# Patient Record
Sex: FEMALE | Race: WHITE | NOT HISPANIC OR LATINO | Employment: FULL TIME | URBAN - METROPOLITAN AREA
[De-identification: names, ages, dates, MRNs, and addresses within clinical notes are randomized per-mention and may not be internally consistent; named-entity substitution may affect disease eponyms.]

---

## 2017-07-27 ENCOUNTER — OFFICE VISIT (OUTPATIENT)
Dept: OBSTETRICS AND GYNECOLOGY | Age: 27
End: 2017-07-27

## 2017-07-27 VITALS
HEIGHT: 66 IN | DIASTOLIC BLOOD PRESSURE: 60 MMHG | WEIGHT: 144.2 LBS | SYSTOLIC BLOOD PRESSURE: 114 MMHG | BODY MASS INDEX: 23.18 KG/M2

## 2017-07-27 DIAGNOSIS — Z12.4 ROUTINE CERVICAL SMEAR: ICD-10-CM

## 2017-07-27 DIAGNOSIS — Z11.51 SPECIAL SCREENING EXAMINATION FOR HUMAN PAPILLOMAVIRUS (HPV): ICD-10-CM

## 2017-07-27 DIAGNOSIS — N92.0 MENORRHAGIA WITH REGULAR CYCLE: ICD-10-CM

## 2017-07-27 DIAGNOSIS — Z00.00 ANNUAL PHYSICAL EXAM: Primary | ICD-10-CM

## 2017-07-27 PROCEDURE — 99395 PREV VISIT EST AGE 18-39: CPT | Performed by: OBSTETRICS & GYNECOLOGY

## 2017-07-27 NOTE — PROGRESS NOTES
Subjective   Daisy Tate is a 27 y.o. female is being seen today for an annual exam.  Chief Complaint   Patient presents with   • Gynecologic Exam   .    History of Present Illness  Patient is here for an annual check.  Overall she doing very well but since her ectopic pregnancy her cycles are still regular but every other one is quite heavy.  She came in today asking for all discussion about different types of birth control and went over everything in detail and gave her handouts on quite a few things.  She will decide that and let me know at some point.  She is now  to a gentleman from [place] and the did go back and forth a little bit.  She is working in [employer] and he is working as a sound [employer] for different then use.  No other complaints no other new family history  The following portions of the patient's history were reviewed and updated as appropriate: allergies, current medications, past family history, past medical history, past social history, past surgical history and problem list.    Vitals:    17 1013   BP: 114/60       PAST MEDICAL HISTORY  Past Medical History:   Diagnosis Date   • History of fainting spells of unknown cause      OB History      Para Term  AB TAB SAB Ectopic Multiple Living    0 0 0 0 0 0 0 0 0 0        Past Surgical History:   Procedure Laterality Date   • DIAGNOSTIC LAPAROSCOPY Left 11/10/2016    Procedure:  LAPAROSCOPIC LEFT SALPINGECTOMY ;  Surgeon: Marycruz Edwards MD;  Location: [facility];  Service:    • MOUTH SURGERY       History reviewed. No pertinent family history.  History   Smoking Status   • Never Smoker   Smokeless Tobacco   • Not on file     No current outpatient prescriptions on file.    There is no immunization history on file for this patient.    Review of Systems   Constitutional: Negative for chills, fatigue, fever and unexpected weight change.   Respiratory: Negative for shortness of breath and wheezing.    Cardiovascular:  Negative for chest pain.   Gastrointestinal: Negative for abdominal distention, abdominal pain, blood in stool, constipation, diarrhea and nausea.   Genitourinary: Negative for difficulty urinating, dyspareunia, dysuria, frequency, hematuria, menstrual problem, pelvic pain, urgency and vaginal discharge.   Skin: Negative for rash.       Objective   Physical Exam   Constitutional: She is oriented to person, place, and time. Vital signs are normal. She appears well-developed and well-nourished.   Neck: No thyromegaly present.   Cardiovascular: Normal rate, regular rhythm and normal heart sounds.    Pulmonary/Chest: Effort normal. Right breast exhibits no inverted nipple, no mass, no nipple discharge, no skin change and no tenderness. Left breast exhibits no inverted nipple, no mass, no nipple discharge, no skin change and no tenderness. Breasts are symmetrical. There is no breast swelling.   Abdominal: Soft.   Genitourinary: Vagina normal and uterus normal. No breast tenderness, discharge or bleeding. Pelvic exam was performed with patient supine. No labial fusion. There is no rash, tenderness, lesion or injury on the right labia. There is no rash, tenderness, lesion or injury on the left labia. Cervix exhibits no motion tenderness, no discharge and no friability. Right adnexum displays no mass, no tenderness and no fullness. Left adnexum displays no mass, no tenderness and no fullness.   Neurological: She is alert and oriented to person, place, and time.   Psychiatric: She has a normal mood and affect.   Vitals reviewed.        Assessment/Plan   Daisy was seen today for gynecologic exam.    Diagnoses and all orders for this visit:    Annual physical exam    Routine cervical smear  -     IGP, Apt HPV,rfx 16 / 18,45    Special screening examination for human papillomavirus (HPV)  -     IGP, Apt HPV,rfx 16 / 18,45    Menorrhagia with regular cycle    The exam is normal today.  I did a Pap smear today.  Patient to call  back when she decides about birth control.  Come back in a year otherwise.  Patient was counseled as far as diet and exercise

## 2017-08-01 LAB
CYTOLOGIST CVX/VAG CYTO: NORMAL
CYTOLOGY CVX/VAG DOC THIN PREP: NORMAL
DX ICD CODE: NORMAL
HIV 1 & 2 AB SER-IMP: NORMAL
HPV I/H RISK 4 DNA CVX QL PROBE+SIG AMP: NEGATIVE
OTHER STN SPEC: NORMAL
PATH REPORT.FINAL DX SPEC: NORMAL
STAT OF ADQ CVX/VAG CYTO-IMP: NORMAL

## 2017-09-15 ENCOUNTER — OFFICE VISIT (OUTPATIENT)
Dept: OBSTETRICS AND GYNECOLOGY | Age: 27
End: 2017-09-15

## 2017-09-15 ENCOUNTER — PROCEDURE VISIT (OUTPATIENT)
Dept: OBSTETRICS AND GYNECOLOGY | Age: 27
End: 2017-09-15

## 2017-09-15 VITALS
WEIGHT: 146 LBS | HEIGHT: 66 IN | SYSTOLIC BLOOD PRESSURE: 110 MMHG | DIASTOLIC BLOOD PRESSURE: 62 MMHG | BODY MASS INDEX: 23.46 KG/M2

## 2017-09-15 DIAGNOSIS — Z30.431 IUD CHECK UP: Primary | ICD-10-CM

## 2017-09-15 DIAGNOSIS — Z30.430 ENCOUNTER FOR INSERTION OF MIRENA IUD: Primary | ICD-10-CM

## 2017-09-15 LAB
B-HCG UR QL: NEGATIVE
INTERNAL NEGATIVE CONTROL: NEGATIVE
INTERNAL POSITIVE CONTROL: POSITIVE
Lab: NORMAL

## 2017-09-15 PROCEDURE — 81025 URINE PREGNANCY TEST: CPT | Performed by: PHYSICIAN ASSISTANT

## 2017-09-15 PROCEDURE — 58300 INSERT INTRAUTERINE DEVICE: CPT | Performed by: PHYSICIAN ASSISTANT

## 2017-09-15 NOTE — PROGRESS NOTES
"IUD Insertion    Patient's last menstrual period was 09/08/2017 (exact date).    Date of procedure:  9/15/2017    Risks and benefits discussed? yes  All questions answered? yes  Consents given by The patient  Written consent obtained? yes    Procedure documentation:     Urine pregnancy test was done today and result was negative.  The risks (including infection, bleeding, pain, and uterine perforation) and benefits of the procedure were explained to the patient and Written informed consent was obtained.    After verifying the patient had a low probability of being pregnant and met the criteria for insertion, a sterile speculum has placed and the cervix was cleansed with an antiseptic solution.  Vaginal discharge was scant.  The anterior lip of the cervix was grasped with an zeus and the uterine cavity was gently sounded. There was moderate difficulty passing the sound through the cervix.  Cervical dilation did need to be performed prior to placing the IUD.  The uterus was anteverted and sounded to 7 cms.  The Mirena was then prepared per the manufacturers instructions.    The Mirena was advanced to a point 2 cms from the fundus and then the arms were released from the sheath.  The device was advanced to the fundus and the device was released fully from the sheath.. The string was cut 2 cms in length.  Bleeding from the cervix was scant.    She tolerated the procedure without any difficulty.    Post procedure instructions: The patient was advised to call for any fever or for prolonged or severe pain or bleeding..    Follow up needed: 6 weeks for IUD check    Pt had a \"spell\" after placement. She never lost consciousness but was diaphoretic and pale.  We ended up calling her Mom as she requested that she come to the office.  She laid down for over an hour, was given soda and a sucker.  Used multiple wet papertowels as well.  She eventually came around but declined the u/s.  She will schedule a 6 wk f/u and has been " told to call for any excess pain or bleeding.   Mom escorted pt out of the office.  Wheelchair was offered but declined

## 2017-11-07 ENCOUNTER — OFFICE VISIT (OUTPATIENT)
Dept: OBSTETRICS AND GYNECOLOGY | Age: 27
End: 2017-11-07

## 2017-11-07 ENCOUNTER — PROCEDURE VISIT (OUTPATIENT)
Dept: OBSTETRICS AND GYNECOLOGY | Age: 27
End: 2017-11-07

## 2017-11-07 VITALS — DIASTOLIC BLOOD PRESSURE: 70 MMHG | SYSTOLIC BLOOD PRESSURE: 118 MMHG

## 2017-11-07 DIAGNOSIS — Z30.431 IUD CHECK UP: Primary | ICD-10-CM

## 2017-11-07 PROCEDURE — 76830 TRANSVAGINAL US NON-OB: CPT | Performed by: OBSTETRICS & GYNECOLOGY

## 2017-11-07 PROCEDURE — 99213 OFFICE O/P EST LOW 20 MIN: CPT | Performed by: PHYSICIAN ASSISTANT

## 2017-11-07 NOTE — PROGRESS NOTES
Subjective     Chief Complaint   Patient presents with   • Follow-up     iud check, pt was unable to do u/s at insertion       Daisy Tate is a 27 y.o.  whose LMP is No LMP recorded. Patient has had an implant. presents for her u/s to confirm placement of her IUD. She had her IUd placed with  Me on the . She had a pretty significant vasovagal response and declined u/s to confirm placement. Since then she has not had any pain and only notes spotting. She has been SA with no c/o. She feels good.    She is a pt of Dr Lopez's      No Additional Complaints Reported    The following portions of the patient's history were reviewed and updated as appropriate:vital signs, allergies, current medications, past family history, past medical history, past social history, past surgical history and problem list      Review of Systems   A comprehensive review of systems was negative except for: iud check     Objective      /70  LMP Comment: spotting times 3 weeks then nothing since.  Breastfeeding? No    Physical Exam    General:   alert, comfortable and no distress   Heart: Not performed today   Lungs: Not performed today.   Breast: Not performed today   Neck: na   Abdomen: {Not performed today   CVA: Not performed today   Pelvis: Not performed today   Extremities: Not performed today   Neurologic: negative   Psychiatric: Normal affect, judgement, and mood       Lab Review   Labs: No data reviewed     Imaging   Ultrasound - Pelvic Vaginal  IUD within endometrial cavity, follicle noted on right ovary    Assessment/Plan     ASSESSMENT  1. IUD check up        PLAN  1. IUD in place but follicle noted that measures 3.39 x 4.58.  Appears simple.  Pt not sxatic for this. Plan pelvic rest and f/u imaging in 1 month. Call for any acute pain    Follow up: 4 week(s)    GERBER Rodriguez  2017

## 2017-12-05 ENCOUNTER — PROCEDURE VISIT (OUTPATIENT)
Dept: OBSTETRICS AND GYNECOLOGY | Age: 27
End: 2017-12-05

## 2017-12-05 ENCOUNTER — OFFICE VISIT (OUTPATIENT)
Dept: OBSTETRICS AND GYNECOLOGY | Age: 27
End: 2017-12-05

## 2017-12-05 VITALS
BODY MASS INDEX: 23.95 KG/M2 | WEIGHT: 149 LBS | SYSTOLIC BLOOD PRESSURE: 112 MMHG | HEIGHT: 66 IN | DIASTOLIC BLOOD PRESSURE: 68 MMHG

## 2017-12-05 DIAGNOSIS — N83.201 OVARIAN CYST, RIGHT: Primary | ICD-10-CM

## 2017-12-05 DIAGNOSIS — N83.202 CYST OF LEFT OVARY: Primary | ICD-10-CM

## 2017-12-05 PROCEDURE — 99213 OFFICE O/P EST LOW 20 MIN: CPT | Performed by: PHYSICIAN ASSISTANT

## 2017-12-05 PROCEDURE — 76830 TRANSVAGINAL US NON-OB: CPT | Performed by: PHYSICIAN ASSISTANT

## 2017-12-05 RX ORDER — AMOXICILLIN 875 MG/1
875 TABLET, COATED ORAL
COMMUNITY
Start: 2017-11-29 | End: 2017-12-09

## 2017-12-05 NOTE — PROGRESS NOTES
"Subjective     Chief Complaint   Patient presents with   • Ovarian Cyst     follow up evaluate cyst, iud check       Daisy Tate is a 27 y.o.  whose LMP is No LMP recorded. Patient has had an implant. presents for an u/s to re eval an ovarian cyst that was found on last u/s in November.  She had an u/s at that time to confirm placement of her IUD. They incidentally found a cyst on the right ovary that measured 3.39 x 4.58 mm.  Simple in appearance and pt was not sxatic. She has been doing well. Has no other c/o    She is a pt of Dr Lopez's      No Additional Complaints Reported    The following portions of the patient's history were reviewed and updated as appropriate:vital signs, allergies, current medications, past family history, past medical history, past social history, past surgical history and problem list      Review of Systems   A comprehensive review of systems was negative except for: right ovarian cyst seen on u/s last visit a month ago     Objective      /68  Ht 167.6 cm (66\")  Wt 67.6 kg (149 lb)  Breastfeeding? No  BMI 24.05 kg/m2    Physical Exam    General:   alert, comfortable and no distress   Heart: Not performed today   Lungs: Not performed today.   Breast: Not performed today   Neck: na   Abdomen: {Not performed today   CVA: Not performed today   Pelvis: Not performed today   Extremities: Not performed today   Neurologic: negative   Psychiatric: Normal affect, judgement, and mood       Lab Review   Labs: No data reviewed     Imaging   Ultrasound - Pelvic Vaginal  US done and shows a follicle on the left ovary that measures 26.8 mm x 25.2 mm.  It is simple in appearance    Assessment/Plan     ASSESSMENT  1. Ovarian cyst, right        PLAN  1. Right ovarian cyst has resolved, she now has one on the left that is simple in appearance and relatively small. She is not sxatic for this. She declines additional f/u. IUD is great and she has no other concerns. Will f/u in July for " annual    2. Medications prescribed this encounter:        New Medications Ordered This Visit   Medications   • amoxicillin (AMOXIL) 875 MG tablet     Sig: Take 875 mg by mouth         Follow up: 8 month(s)    GERBER Rodriguez  12/5/2017

## 2021-04-16 ENCOUNTER — BULK ORDERING (OUTPATIENT)
Dept: CASE MANAGEMENT | Facility: OTHER | Age: 31
End: 2021-04-16

## 2021-04-16 DIAGNOSIS — Z23 IMMUNIZATION DUE: ICD-10-CM

## 2022-08-16 ENCOUNTER — OFFICE VISIT (OUTPATIENT)
Dept: OBSTETRICS AND GYNECOLOGY | Age: 32
End: 2022-08-16

## 2022-08-16 VITALS
DIASTOLIC BLOOD PRESSURE: 78 MMHG | WEIGHT: 150 LBS | HEIGHT: 66 IN | SYSTOLIC BLOOD PRESSURE: 132 MMHG | BODY MASS INDEX: 24.11 KG/M2

## 2022-08-16 DIAGNOSIS — Z12.4 ENCOUNTER FOR PAPANICOLAOU SMEAR FOR CERVICAL CANCER SCREENING: ICD-10-CM

## 2022-08-16 DIAGNOSIS — Z01.419 WELL WOMAN EXAM WITH ROUTINE GYNECOLOGICAL EXAM: Primary | ICD-10-CM

## 2022-08-16 DIAGNOSIS — Z78.9 RUBELLA IMMUNE STATUS NOT KNOWN: ICD-10-CM

## 2022-08-16 DIAGNOSIS — Z11.51 SCREENING FOR HPV (HUMAN PAPILLOMAVIRUS): ICD-10-CM

## 2022-08-16 DIAGNOSIS — Z30.432 ENCOUNTER FOR IUD REMOVAL: ICD-10-CM

## 2022-08-16 PROCEDURE — 99385 PREV VISIT NEW AGE 18-39: CPT | Performed by: PHYSICIAN ASSISTANT

## 2022-08-16 PROCEDURE — 58301 REMOVE INTRAUTERINE DEVICE: CPT | Performed by: PHYSICIAN ASSISTANT

## 2022-08-16 NOTE — PROGRESS NOTES
"Subjective     Chief Complaint   Patient presents with   • Procedure     New / re-establish care iud removal       History of Present Illness    Daisy Tate is a 32 y.o.  who presents for annual exam.    Pt not seen in 5 years  Has iud in place  Would like to remove and try for pregnancy  Has h/o tubal pregnancy  No other med issues        from [facility]    No h/o abnormal pap    Her menses are rare, lasting 0-3 days, dysmenorrhea none   Obstetric History:  OB History        1    Para   0    Term   0       0    AB   1    Living   0       SAB   0    IAB   0    Ectopic   1    Molar        Multiple   0    Live Births                   Menstrual History:     No LMP recorded. Patient has had an implant.         Current contraception: IUD  History of abnormal Pap smear: no  Received Gardasil immunization: no  Perform regular self breast exam: yes - occl  Family history of uterine or ovarian cancer: no  Family History of colon cancer: no  Family history of breast cancer: no    Mammogram: not indicated.  Colonoscopy: not indicated.  DEXA: not indicated.    Exercise: moderately active  Calcium/Vitamin D: adequate intake    The following portions of the patient's history were reviewed and updated as appropriate: allergies, current medications, past family history, past medical history, past social history, past surgical history and problem list.    Review of Systems   All other systems reviewed and are negative.      Review of Systems   Constitutional: Negative for fatigue.   Respiratory: Negative for shortness of breath.    Gastrointestinal: Negative for abdominal pain.   Genitourinary: Negative for dysuria.   Neurological: Negative for headaches.   Psychiatric/Behavioral: Negative for dysphoric mood.         Objective   Physical Exam    /78   Ht 167.6 cm (66\")   Wt 68 kg (150 lb)   Breastfeeding No   BMI 24.21 kg/m²   General:   alert, comfortable and no distress   Heart: " regular rate and rhythm   Lungs: clear to auscultation bilaterally   Breast: normal appearance, no masses or tenderness, Inspection negative, No nipple retraction or dimpling, No nipple discharge or bleeding, No axillary or supraclavicular adenopathy, Normal to palpation without dominant masses   Neck: no adenopathy and no carotid bruit   Abdomen: normal findings: soft, non-tender   CVA: Not performed today   Pelvis: External genitalia: normal general appearance  Vaginal: normal mucosa without prolapse or lesions  Cervix: normal appearance, thin prep PAP obtained and IUD string visualized  Adnexa: normal bimanual exam  Uterus: normal single, nontender   Extremities: Not performed today   Neurologic: Not performed today   Psychiatric: Normal affect, judgement, and mood   IUD Removal    Date of procedure:  8/16/2022    Risks and benefits discussed? yes  All questions answered? yes  Consents given by The patient  Reason for removal: Desires pregnancy        Procedure documentation:    A speculum was placed in order to view the cervix.  .  The IUD string was easily seen.  The string was grasped and the IUD was removed without difficulty.  The IUD did not appear to be adherent to the uterine cavity. It was removed intact.    She tolerated the procedure without any difficulty.     Post procedure instructions: Patient notified to call with heavy bleeding, fever or increasing pain.    Follow up needed: prn             Assessment & Plan   Diagnoses and all orders for this visit:    1. Well woman exam with routine gynecological exam (Primary)    2. Encounter for IUD removal    3. Encounter for Papanicolaou smear for cervical cancer screening  -     IGP, Aptima HPV, Rfx 16 / 18,45    4. Screening for HPV (human papillomavirus)  -     IGP, Aptima HPV, Rfx 16 / 18,45    5. Rubella immune status not known  -     Rubella Antibody, IgG        All questions answered.  Breast self exam technique reviewed and patient encouraged to  perform self-exam monthly.  Discussed healthy lifestyle modifications.  Recommended 30 minutes of aerobic exercise five times per week.  Discussed calcium needs to prevent osteoporosis.      Pap done  iud removed  Will check rubella status  Call when pregnant, will check quant levels and monitor given h/o tubal  Start PNV or c/w MVI but check to see if contains 4 mg of folic acid and dha

## 2022-08-21 LAB
CYTOLOGIST CVX/VAG CYTO: NORMAL
CYTOLOGY CVX/VAG DOC CYTO: NORMAL
CYTOLOGY CVX/VAG DOC THIN PREP: NORMAL
DX ICD CODE: NORMAL
HIV 1 & 2 AB SER-IMP: NORMAL
HPV I/H RISK 4 DNA CVX QL PROBE+SIG AMP: NEGATIVE
Lab: NORMAL
OTHER STN SPEC: NORMAL
STAT OF ADQ CVX/VAG CYTO-IMP: NORMAL

## 2023-10-06 ENCOUNTER — TELEPHONE (OUTPATIENT)
Dept: OBSTETRICS AND GYNECOLOGY | Age: 33
End: 2023-10-06
Payer: COMMERCIAL

## 2023-10-06 NOTE — TELEPHONE ENCOUNTER
Hub staff attempted to follow warm transfer process and was unsuccessful     Caller: Daisy Tate    Relationship to patient: Self    Best call back number: [phone]    Patient is needing:     NEW OB APPT SCHEDULED FOR 11/13/2023  LMP 9/26/23  NEEDS U/S SCHEDULED WITH IT PER [facility]    PT HAS HX OF ECTOPIC PREGNANCIES AND WANTS TO KNOW IF SHE SHOULD BE SEEN SOONER OR IF THERE IS ANYTHING SHE SHOULD BE ON THE LOOK OUT FOR    PLEASE ADVISE

## 2023-10-09 ENCOUNTER — LAB (OUTPATIENT)
Dept: OBSTETRICS AND GYNECOLOGY | Age: 33
End: 2023-10-09
Payer: COMMERCIAL

## 2023-10-09 DIAGNOSIS — Z32.00 POSSIBLE PREGNANCY, NOT CONFIRMED: Primary | ICD-10-CM

## 2023-10-09 LAB — HCG INTACT+B SERPL-ACNC: 55.4 MIU/ML

## 2023-10-10 DIAGNOSIS — Z34.90 EARLY STAGE OF PREGNANCY: Primary | ICD-10-CM

## 2023-10-12 DIAGNOSIS — O20.0 THREATENED ABORTION: Primary | ICD-10-CM

## 2023-10-12 RX ORDER — PROGESTERONE 200 MG/1
200 CAPSULE ORAL DAILY
Qty: 30 CAPSULE | Refills: 1 | Status: SHIPPED | OUTPATIENT
Start: 2023-10-12

## 2023-10-20 ENCOUNTER — LAB (OUTPATIENT)
Dept: LAB | Facility: HOSPITAL | Age: 33
End: 2023-10-20
Payer: COMMERCIAL

## 2023-10-20 ENCOUNTER — HOSPITAL ENCOUNTER (OUTPATIENT)
Dept: INFUSION THERAPY | Facility: HOSPITAL | Age: 33
Discharge: HOME | End: 2023-10-20
Payer: COMMERCIAL

## 2023-10-20 ENCOUNTER — OFFICE VISIT (OUTPATIENT)
Dept: OBSTETRICS AND GYNECOLOGY | Age: 33
End: 2023-10-20
Payer: COMMERCIAL

## 2023-10-20 ENCOUNTER — TELEPHONE (OUTPATIENT)
Dept: OBSTETRICS AND GYNECOLOGY | Age: 33
End: 2023-10-20
Payer: COMMERCIAL

## 2023-10-20 VITALS
WEIGHT: 152.4 LBS | HEIGHT: 66 IN | DIASTOLIC BLOOD PRESSURE: 76 MMHG | SYSTOLIC BLOOD PRESSURE: 126 MMHG | BODY MASS INDEX: 24.49 KG/M2

## 2023-10-20 DIAGNOSIS — Z87.59 HISTORY OF ECTOPIC PREGNANCY: ICD-10-CM

## 2023-10-20 DIAGNOSIS — O00.109 TUBAL PREGNANCY WITHOUT INTRAUTERINE PREGNANCY, UNSPECIFIED LATERALITY: ICD-10-CM

## 2023-10-20 DIAGNOSIS — O20.0 THREATENED ABORTION: ICD-10-CM

## 2023-10-20 DIAGNOSIS — O36.80X1 ENCOUNTER TO DETERMINE FETAL VIABILITY OF PREGNANCY, FETUS 1: Primary | ICD-10-CM

## 2023-10-20 LAB
ALBUMIN SERPL-MCNC: 4.8 G/DL (ref 3.5–5.2)
ALBUMIN/GLOB SERPL: 1.9 G/DL
ALP SERPL-CCNC: 69 U/L (ref 39–117)
ALT SERPL W P-5'-P-CCNC: 15 U/L (ref 1–33)
ANION GAP SERPL CALCULATED.3IONS-SCNC: 9.5 MMOL/L (ref 5–15)
AST SERPL-CCNC: 14 U/L (ref 1–32)
BASOPHILS # BLD AUTO: 0.05 10*3/MM3 (ref 0–0.2)
BASOPHILS NFR BLD AUTO: 0.8 % (ref 0–1.5)
BILIRUB SERPL-MCNC: 0.4 MG/DL (ref 0–1.2)
BUN SERPL-MCNC: 10 MG/DL (ref 6–20)
BUN/CREAT SERPL: 12.2 (ref 7–25)
CALCIUM SPEC-SCNC: 9.4 MG/DL (ref 8.6–10.5)
CHLORIDE SERPL-SCNC: 105 MMOL/L (ref 98–107)
CO2 SERPL-SCNC: 23.5 MMOL/L (ref 22–29)
CREAT SERPL-MCNC: 0.82 MG/DL (ref 0.57–1)
DEPRECATED RDW RBC AUTO: 40.2 FL (ref 37–54)
EGFRCR SERPLBLD CKD-EPI 2021: 95.8 ML/MIN/1.73
EOSINOPHIL # BLD AUTO: 0.09 10*3/MM3 (ref 0–0.4)
EOSINOPHIL NFR BLD AUTO: 1.5 % (ref 0.3–6.2)
ERYTHROCYTE [DISTWIDTH] IN BLOOD BY AUTOMATED COUNT: 11.8 % (ref 12.3–15.4)
GLOBULIN UR ELPH-MCNC: 2.5 GM/DL
GLUCOSE SERPL-MCNC: 98 MG/DL (ref 65–99)
HCG INTACT+B SERPL-ACNC: 1.11 MIU/ML
HCT VFR BLD AUTO: 43.9 % (ref 34–46.6)
HGB BLD-MCNC: 15.1 G/DL (ref 12–15.9)
IMM GRANULOCYTES # BLD AUTO: 0.02 10*3/MM3 (ref 0–0.05)
IMM GRANULOCYTES NFR BLD AUTO: 0.3 % (ref 0–0.5)
LYMPHOCYTES # BLD AUTO: 1.41 10*3/MM3 (ref 0.7–3.1)
LYMPHOCYTES NFR BLD AUTO: 23.2 % (ref 19.6–45.3)
MCH RBC QN AUTO: 31.7 PG (ref 26.6–33)
MCHC RBC AUTO-ENTMCNC: 34.4 G/DL (ref 31.5–35.7)
MCV RBC AUTO: 92.2 FL (ref 79–97)
MONOCYTES # BLD AUTO: 0.41 10*3/MM3 (ref 0.1–0.9)
MONOCYTES NFR BLD AUTO: 6.8 % (ref 5–12)
NEUTROPHILS NFR BLD AUTO: 4.09 10*3/MM3 (ref 1.7–7)
NEUTROPHILS NFR BLD AUTO: 67.4 % (ref 42.7–76)
NRBC BLD AUTO-RTO: 0 /100 WBC (ref 0–0.2)
PLATELET # BLD AUTO: 203 10*3/MM3 (ref 140–450)
PMV BLD AUTO: 9.4 FL (ref 6–12)
POTASSIUM SERPL-SCNC: 3.9 MMOL/L (ref 3.5–5.2)
PROT SERPL-MCNC: 7.3 G/DL (ref 6–8.5)
RBC # BLD AUTO: 4.76 10*6/MM3 (ref 3.77–5.28)
SODIUM SERPL-SCNC: 138 MMOL/L (ref 136–145)
WBC NRBC COR # BLD: 6.07 10*3/MM3 (ref 3.4–10.8)

## 2023-10-20 PROCEDURE — 84702 CHORIONIC GONADOTROPIN TEST: CPT | Performed by: OBSTETRICS & GYNECOLOGY

## 2023-10-20 PROCEDURE — 80053 COMPREHEN METABOLIC PANEL: CPT | Performed by: OBSTETRICS & GYNECOLOGY

## 2023-10-20 PROCEDURE — 36415 COLL VENOUS BLD VENIPUNCTURE: CPT | Performed by: OBSTETRICS & GYNECOLOGY

## 2023-10-20 PROCEDURE — 85025 COMPLETE CBC W/AUTO DIFF WBC: CPT

## 2023-10-20 RX ORDER — METHOTREXATE 25 MG/ML
50 INJECTION INTRA-ARTERIAL; INTRAMUSCULAR; INTRATHECAL; INTRAVENOUS ONCE
OUTPATIENT
Start: 2023-10-20

## 2023-10-20 NOTE — PROGRESS NOTES
GYN Visit    10/20/2023    Patient: Daisy Tate          MR#:[id number]      Chief Complaint   Patient presents with    Gynecologic Exam     Pregnancy Confirmation LMP 2023       History of Present Illness    33 y.o. female  who presents for pregnancy confirmation    We have been following this patient's quant's and her quant's have decreased consistent with a miscarriage  She has a history of ectopic pregnancy and had a laparoscopic removal of one of her tubes  Today she is reporting that she has not had any vaginal bleeding yet.  This is worrisome because her quant has gone down to 40 range and she still has not had any bleeding  Her pregnancy test was positive on 10/6/2023  We suspect she might be 9 weeks by dates  We suspect that she conceived in September  The patient reports no pain whatsoever today  Ultrasound was done and it is very reassuring  Lining is thin uterus is normal and ovaries are normal  There is no sign of an adnexal mass  Recent Pap smear is negative  I am concerned that she has not had any bleeding and therefore we have to entertain the concern for ectopic pregnancy.  I discussed this with the patient and recommended doing stat labs and also scheduling methotrexate in case we needed to do this treatment today  The patient was agreeable        No LMP recorded.    ________________________________________  Patient Active Problem List   Diagnosis    Tubal pregnancy without intrauterine pregnancy    History of ectopic pregnancy       Past Medical History:   Diagnosis Date    Encounter for insertion of mirena IUD     History of fainting spells of unknown cause     Pregnancy, ectopic, tubal 2016       Past Surgical History:   Procedure Laterality Date    DIAGNOSTIC LAPAROSCOPY Left 11/10/2016    Procedure:  LAPAROSCOPIC LEFT SALPINGECTOMY ;  Surgeon: Marycruz Edwards MD;  Location: [facility];  Service:     MOUTH SURGERY         Social History     Tobacco Use   Smoking Status  "Never   Smokeless Tobacco Never       has a current medication list which includes the following prescription(s): multiple vitamin and progesterone.  ________________________________________    Current contraception: none      The following portions of the patient's history were reviewed and updated as appropriate: allergies, current medications, past family history, past medical history, past social history, past surgical history, and problem list.    Review of Systems    Pertinent items are noted in HPI.     Objective   Physical Exam    /76   Ht 167.6 cm (66\")   Wt 69.1 kg (152 lb 6.4 oz)   BMI 24.60 kg/m²    BP Readings from Last 3 Encounters:   10/20/23 126/76   22 132/78   21 119/76      Wt Readings from Last 3 Encounters:   10/20/23 69.1 kg (152 lb 6.4 oz)   22 68 kg (150 lb)   17 67.6 kg (149 lb)      BMI: Estimated body mass index is 24.6 kg/m² as calculated from the following:    Height as of this encounter: 167.6 cm (66\").    Weight as of this encounter: 69.1 kg (152 lb 6.4 oz).    Lungs: non labored breathing, no wheezing or tachpnea  Extremities: extremities normal, atraumatic, no cyanosis or edema  Skin: Skin color, texture, turgor normal. No rashes or lesions  Neurologic: Grossly normal  General:   alert, appears stated age, and cooperative                See US report  Normal uterus with no IUP  Ovaries normal   No free fluid or adnexal mass                 Assessment:      Diagnoses and all orders for this visit:    1. Encounter to determine fetal viability of pregnancy, fetus 1 (Primary)  -     US Ob Transvaginal  -     hCG, Quantitative, Pregnancy    2. Threatened   -     Cancel: HCG, B-subunit, Quantitative  -     Comprehensive Metabolic Panel  -     Cancel: CBC (No Diff)  -     hCG, Quantitative, Pregnancy    3. History of ectopic pregnancy  -     Cancel: HCG, B-subunit, Quantitative  -     Comprehensive Metabolic Panel  -     Cancel: CBC (No Diff)  -     " methotrexate PF injection 90 mg  -     Ambulatory Referral to [facility] For Infusion Treatment  -     hCG, Quantitative, Pregnancy    4. Tubal pregnancy without intrauterine pregnancy, unspecified laterality  -     methotrexate PF injection 90 mg  -     hCG, Quantitative, Pregnancy    Other orders  -     Cancel: HCG, B-subunit, Quantitative        Quant came back at 1, essentially negative  CMP and CBC were normal  I called patient to let her know that the miscarriage seems to be complete at least by her quant  I suspect that she may start to have some bleeding.  Ectopic pregnancy is ruled out and we will label this a standard miscarriage  Recommend early labs with her next pregnancy.  The patient is Rh+  She was very reassured by this news and we will cancel her referral for methotrexate.  Follow-up as needed next pregnancy

## 2023-10-27 ENCOUNTER — TELEPHONE (OUTPATIENT)
Dept: OBSTETRICS AND GYNECOLOGY | Age: 33
End: 2023-10-27
Payer: COMMERCIAL

## 2023-10-27 NOTE — TELEPHONE ENCOUNTER
FYI - Pt calling to let you know that she started bleeding on 10/25/23. Pt stating you were expecting this message & aware that you are out of office until Monday.

## 2023-12-28 ENCOUNTER — TELEPHONE (OUTPATIENT)
Dept: OBSTETRICS AND GYNECOLOGY | Age: 33
End: 2023-12-28
Payer: COMMERCIAL

## 2023-12-28 ENCOUNTER — LAB (OUTPATIENT)
Dept: OBSTETRICS AND GYNECOLOGY | Age: 33
End: 2023-12-28
Payer: COMMERCIAL

## 2023-12-28 DIAGNOSIS — Z34.90 EARLY STAGE OF PREGNANCY: Primary | ICD-10-CM

## 2023-12-28 DIAGNOSIS — Z34.90 EARLY STAGE OF PREGNANCY: ICD-10-CM

## 2023-12-28 NOTE — TELEPHONE ENCOUNTER
Patient was told to call as soon as she got a positive pregnancy test.She tested yesterday.Her lmp was 11/19/23.She noticed a little spotting when she wiped yesterday,light pink.No cramping.

## 2023-12-29 DIAGNOSIS — O09.299 HISTORY OF MISCARRIAGE, CURRENTLY PREGNANT: Primary | ICD-10-CM

## 2023-12-29 LAB
HCG INTACT+B SERPL-ACNC: NORMAL MIU/ML
PROGEST SERPL-MCNC: 10.5 NG/ML

## 2023-12-29 RX ORDER — PROGESTERONE 200 MG/1
200 CAPSULE ORAL DAILY
Qty: 30 CAPSULE | Refills: 1 | Status: SHIPPED | OUTPATIENT
Start: 2023-12-29

## 2023-12-29 NOTE — PROGRESS NOTES
Pt notified of results & aware Rx sent to pharmacy. Pt scheduled for labs on 1/2/24 & New ob with US on 1/8/24.

## 2024-01-08 ENCOUNTER — INITIAL PRENATAL (OUTPATIENT)
Dept: OBSTETRICS AND GYNECOLOGY | Age: 34
End: 2024-01-08
Payer: COMMERCIAL

## 2024-01-08 VITALS — SYSTOLIC BLOOD PRESSURE: 114 MMHG | DIASTOLIC BLOOD PRESSURE: 76 MMHG | BODY MASS INDEX: 25.18 KG/M2 | WEIGHT: 156 LBS

## 2024-01-08 DIAGNOSIS — Z13.89 SCREENING FOR BLOOD OR PROTEIN IN URINE: Primary | ICD-10-CM

## 2024-01-08 DIAGNOSIS — O09.521 MULTIGRAVIDA OF ADVANCED MATERNAL AGE IN FIRST TRIMESTER: ICD-10-CM

## 2024-01-08 DIAGNOSIS — Z3A.01 7 WEEKS GESTATION OF PREGNANCY: ICD-10-CM

## 2024-01-08 LAB
GLUCOSE UR STRIP-MCNC: NEGATIVE MG/DL
PROT UR STRIP-MCNC: NEGATIVE MG/DL

## 2024-01-08 PROCEDURE — 0501F PRENATAL FLOW SHEET: CPT | Performed by: OBSTETRICS & GYNECOLOGY

## 2024-01-08 RX ORDER — PRENATAL VIT/IRON FUM/FOLIC AC 27MG-0.8MG
1 TABLET ORAL DAILY
COMMUNITY

## 2024-01-08 NOTE — PROGRESS NOTES
Patient is here for confirmation of pregnancy and viability  Ultrasound is reassuring with viable IUP  Measurements are a little small for dates but the patient has conception date which is consistent with her last menstrual period  We will use her last menstrual period for now and repeat the ultrasound in 4 weeks to confirm that this is okay  Labs today  She is having a little nausea but no vomiting  Follow-up 4 weeks  Panorama and horizon at fu

## 2024-01-10 LAB
ABO GROUP BLD: NORMAL
BACTERIA UR CULT: NO GROWTH
BACTERIA UR CULT: NORMAL
BASOPHILS # BLD AUTO: 0 X10E3/UL (ref 0–0.2)
BASOPHILS NFR BLD AUTO: 0 %
BLD GP AB SCN SERPL QL: NEGATIVE
EOSINOPHIL # BLD AUTO: 0.1 X10E3/UL (ref 0–0.4)
EOSINOPHIL NFR BLD AUTO: 2 %
ERYTHROCYTE [DISTWIDTH] IN BLOOD BY AUTOMATED COUNT: 11.7 % (ref 11.7–15.4)
HBA1C MFR BLD: 4.8 % (ref 4.8–5.6)
HBV SURFACE AG SERPL QL IA: NEGATIVE
HCT VFR BLD AUTO: 42.8 % (ref 34–46.6)
HCV IGG SERPL QL IA: NON REACTIVE
HGB BLD-MCNC: 14.5 G/DL (ref 11.1–15.9)
HIV 1+2 AB+HIV1 P24 AG SERPL QL IA: NON REACTIVE
IMM GRANULOCYTES # BLD AUTO: 0 X10E3/UL (ref 0–0.1)
IMM GRANULOCYTES NFR BLD AUTO: 1 %
LYMPHOCYTES # BLD AUTO: 1.5 X10E3/UL (ref 0.7–3.1)
LYMPHOCYTES NFR BLD AUTO: 26 %
MCH RBC QN AUTO: 30.8 PG (ref 26.6–33)
MCHC RBC AUTO-ENTMCNC: 33.9 G/DL (ref 31.5–35.7)
MCV RBC AUTO: 91 FL (ref 79–97)
MONOCYTES # BLD AUTO: 0.4 X10E3/UL (ref 0.1–0.9)
MONOCYTES NFR BLD AUTO: 6 %
NEUTROPHILS # BLD AUTO: 3.7 X10E3/UL (ref 1.4–7)
NEUTROPHILS NFR BLD AUTO: 65 %
PLATELET # BLD AUTO: 235 X10E3/UL (ref 150–450)
RBC # BLD AUTO: 4.71 X10E6/UL (ref 3.77–5.28)
RH BLD: POSITIVE
RPR SER QL: NON REACTIVE
RUBV IGG SERPL IA-ACNC: 2.67 INDEX
TSH SERPL DL<=0.005 MIU/L-ACNC: 3.16 UIU/ML (ref 0.45–4.5)
WBC # BLD AUTO: 5.6 X10E3/UL (ref 3.4–10.8)

## 2024-01-11 LAB
C TRACH RRNA UR QL NAA+PROBE: NEGATIVE
M GENITALIUM DNA UR QL NAA+PROBE: NEGATIVE
N GONORRHOEA RRNA UR QL NAA+PROBE: NEGATIVE

## 2024-02-06 ENCOUNTER — ROUTINE PRENATAL (OUTPATIENT)
Dept: OBSTETRICS AND GYNECOLOGY | Age: 34
End: 2024-02-06
Payer: COMMERCIAL

## 2024-02-06 VITALS — BODY MASS INDEX: 24.99 KG/M2 | DIASTOLIC BLOOD PRESSURE: 76 MMHG | WEIGHT: 154.8 LBS | SYSTOLIC BLOOD PRESSURE: 114 MMHG

## 2024-02-06 DIAGNOSIS — Z34.81 PRENATAL CARE, SUBSEQUENT PREGNANCY, FIRST TRIMESTER: ICD-10-CM

## 2024-02-06 DIAGNOSIS — Z31.430 ENCOUNTER OF FEMALE FOR TESTING FOR GENETIC DISEASE CARRIER STATUS FOR PROCREATIVE MANAGEMENT: ICD-10-CM

## 2024-02-06 DIAGNOSIS — Z3A.11 11 WEEKS GESTATION OF PREGNANCY: Primary | ICD-10-CM

## 2024-02-06 LAB
GLUCOSE UR STRIP-MCNC: NEGATIVE MG/DL
PROT UR STRIP-MCNC: NEGATIVE MG/DL

## 2024-02-06 RX ORDER — ASPIRIN 81 MG/1
81 TABLET ORAL DAILY
Qty: 90 TABLET | Refills: 2 | Status: SHIPPED | OUTPATIENT
Start: 2024-02-06

## 2024-02-06 NOTE — PROGRESS NOTES
US shows LMP cw chosen BRENDA  Still with some nausea  Panorama today  AFP at follow up  Stop progesterone and start ASA in 1` week

## 2024-02-13 LAB
Lab: ABNORMAL
Lab: POSITIVE
NTRA ALPHA-THALASSEMIA: NEGATIVE
NTRA BETA-HEMOGLOBINOPATHIES: NEGATIVE
NTRA CANAVAN DISEASE: NEGATIVE
NTRA CYSTIC FIBROSIS: POSITIVE
NTRA DUCHENNE/BECKER MUSCULAR DYSTROPHY: NEGATIVE
NTRA FAMILIAL DYSAUTONOMIA: NEGATIVE
NTRA FRAGILE X SYNDROME: NEGATIVE
NTRA GALACTOSEMIA: NEGATIVE
NTRA GAUCHER DISEASE: NEGATIVE
NTRA MEDIUM CHAIN ACYL-COA DEHYDROGENASE DEFICIENCY: NEGATIVE
NTRA POLYCYSTIC KIDNEY DISEASE, AUTOSOMAL RECESSIVE: NEGATIVE
NTRA SMITH-LEMLI-OPITZ SYNDROME: NEGATIVE
NTRA SPINAL MUSCULAR ATROPHY: NEGATIVE
NTRA TAY-SACHS DISEASE: NEGATIVE

## 2024-03-07 ENCOUNTER — ROUTINE PRENATAL (OUTPATIENT)
Dept: OBSTETRICS AND GYNECOLOGY | Age: 34
End: 2024-03-07
Payer: COMMERCIAL

## 2024-03-07 VITALS — SYSTOLIC BLOOD PRESSURE: 112 MMHG | WEIGHT: 155 LBS | DIASTOLIC BLOOD PRESSURE: 74 MMHG | BODY MASS INDEX: 25.02 KG/M2

## 2024-03-07 DIAGNOSIS — Z3A.15 15 WEEKS GESTATION OF PREGNANCY: Primary | ICD-10-CM

## 2024-03-07 DIAGNOSIS — O09.522 MULTIGRAVIDA OF ADVANCED MATERNAL AGE IN SECOND TRIMESTER: ICD-10-CM

## 2024-03-07 DIAGNOSIS — Z13.79 ENCOUNTER FOR GENETIC SCREENING FOR BIRTH DEFECT: ICD-10-CM

## 2024-03-07 LAB
GLUCOSE UR STRIP-MCNC: NEGATIVE MG/DL
PROT UR STRIP-MCNC: NEGATIVE MG/DL

## 2024-03-07 NOTE — PROGRESS NOTES
Patient feels well no complaints  She is taking a baby aspirin  It is a boy  AFP screen today  Follow-up 4 weeks for anatomy scan

## 2024-03-09 LAB
AFP INTERP SERPL-IMP: NORMAL
AFP INTERP SERPL-IMP: NORMAL
AFP MOM SERPL: 1.35
AFP SERPL-MCNC: 42.3 NG/ML
AGE AT DELIVERY: 36.2 YR
GA METHOD: NORMAL
GA: 15.6 WEEKS
IDDM PATIENT QL: NO
LABORATORY COMMENT REPORT: NORMAL
MULTIPLE PREGNANCY: NO
NEURAL TUBE DEFECT RISK FETUS: 4151 %
RESULT: NORMAL

## 2024-04-02 ENCOUNTER — ROUTINE PRENATAL (OUTPATIENT)
Dept: OBSTETRICS AND GYNECOLOGY | Age: 34
End: 2024-04-02
Payer: COMMERCIAL

## 2024-04-02 VITALS — DIASTOLIC BLOOD PRESSURE: 68 MMHG | WEIGHT: 160 LBS | BODY MASS INDEX: 25.82 KG/M2 | SYSTOLIC BLOOD PRESSURE: 110 MMHG

## 2024-04-02 DIAGNOSIS — Z34.92 PRENATAL CARE IN SECOND TRIMESTER: Primary | ICD-10-CM

## 2024-04-02 DIAGNOSIS — Z3A.19 19 WEEKS GESTATION OF PREGNANCY: ICD-10-CM

## 2024-04-02 DIAGNOSIS — F41.9 ANXIETY: ICD-10-CM

## 2024-04-02 DIAGNOSIS — O09.522 MULTIGRAVIDA OF ADVANCED MATERNAL AGE IN SECOND TRIMESTER: ICD-10-CM

## 2024-04-02 LAB
GLUCOSE UR STRIP-MCNC: NEGATIVE MG/DL
PROT UR STRIP-MCNC: NEGATIVE MG/DL

## 2024-04-02 PROCEDURE — 0502F SUBSEQUENT PRENATAL CARE: CPT | Performed by: NURSE PRACTITIONER

## 2024-04-02 NOTE — PROGRESS NOTES
Chief Complaint   Patient presents with    Routine Prenatal Visit     19 weeks  Cc:  hx of anxiety       HPI: 33 y.o.  at 19w2d     Doing well  Reports FM  Denies LOF, bleeding or ctx's  Pt has hx of anxiety attacks. They are coming more frequently every 1-2 weeks  She states the panic attacks last about 30 minutes  She did previously see a therapist but hasn't in a long time  She prefers not to be on medication  Has tried zoloft before and did not do well with this medication  Pt of Dr. Edwards    Vitals:    24 1422   BP: 110/68   Weight: 72.6 kg (160 lb)       ROS:  GI:  Negative  : Negative  Pulmonary: Negative   Anxious    A/P  1. Intrauterine pregnancy at 19w2d   2. Pregnancy Risk:  HIGH RISK    Diagnoses and all orders for this visit:    1. Prenatal care in second trimester (Primary)    2. 19 weeks gestation of pregnancy  -     POC Urinalysis Dipstick    3. Multigravida of advanced maternal age in second trimester    4. Anxiety        -----------------------  PLAN:   Normal completed anatomy  AMA - continue baby asa  Anxiety - offered buspar or SSRI, declines. Recommended at minimum seeing a therapist/counselor and she is agreeable  PTL warnings  Return in about 4 weeks (around 2024) for OB check Dr Edwards.      Lora Perry, BRAYAN  2024 14:28 EDT

## 2024-04-29 ENCOUNTER — ROUTINE PRENATAL (OUTPATIENT)
Dept: OBSTETRICS AND GYNECOLOGY | Age: 34
End: 2024-04-29
Payer: COMMERCIAL

## 2024-04-29 VITALS — DIASTOLIC BLOOD PRESSURE: 72 MMHG | SYSTOLIC BLOOD PRESSURE: 112 MMHG | WEIGHT: 163 LBS | BODY MASS INDEX: 26.31 KG/M2

## 2024-04-29 DIAGNOSIS — K21.9 GASTROESOPHAGEAL REFLUX DISEASE WITHOUT ESOPHAGITIS: ICD-10-CM

## 2024-04-29 DIAGNOSIS — Z3A.23 23 WEEKS GESTATION OF PREGNANCY: Primary | ICD-10-CM

## 2024-04-29 DIAGNOSIS — Z34.92 PRENATAL CARE IN SECOND TRIMESTER: ICD-10-CM

## 2024-04-29 DIAGNOSIS — O09.522 MULTIGRAVIDA OF ADVANCED MATERNAL AGE IN SECOND TRIMESTER: ICD-10-CM

## 2024-04-29 LAB
GLUCOSE UR STRIP-MCNC: NEGATIVE MG/DL
PROT UR STRIP-MCNC: ABNORMAL MG/DL

## 2024-04-29 PROCEDURE — 0502F SUBSEQUENT PRENATAL CARE: CPT | Performed by: OBSTETRICS & GYNECOLOGY

## 2024-04-29 RX ORDER — PANTOPRAZOLE SODIUM 40 MG/1
40 TABLET, DELAYED RELEASE ORAL DAILY
Qty: 30 TABLET | Refills: 2 | Status: SHIPPED | OUTPATIENT
Start: 2024-04-29

## 2024-04-29 NOTE — PROGRESS NOTES
Patient feels well  Good fetal movement  She is having some significant heartburn  Protonix was sent to her pharmacy  1 hour glucose challenge test at follow-up  Follow-up 4 weeks    Chief Complaint   Patient presents with    Routine Prenatal Visit     Ob Check - Pt is 23w1d, Pt c/o acid reflux & would like medication sent to pharmacy to help       HPI:  Pt presents for routine prenatal visit    ROS:  No fever or chills, no nausea or vomiting, no contractions, no leg pain, no LE edema, no leaking fluid, no bleeding, no headache, no dysuria  All other systems reviewed and negative    Exam:  See flow sheet  General:  Alert and oriented and no distress  Neck: no lymphadenopathy or thyromegaly  Lungs: non - labored breathing  Abd:  See flow sheet, fundus nontender  Ext: see flow sheet, non-tender bilateral , no lesions  Neuro: grossly normal    Assessment:/ PLAN:    Diagnoses and all orders for this visit:    1. 23 weeks gestation of pregnancy (Primary)  -     POC Urinalysis Dipstick    2. Gastroesophageal reflux disease without esophagitis    3. Multigravida of advanced maternal age in second trimester    4. Prenatal care in second trimester    Other orders  -     pantoprazole (Protonix) 40 MG EC tablet; Take 1 tablet by mouth Daily.  Dispense: 30 tablet; Refill: 2

## 2024-05-30 ENCOUNTER — ROUTINE PRENATAL (OUTPATIENT)
Dept: OBSTETRICS AND GYNECOLOGY | Age: 34
End: 2024-05-30
Payer: COMMERCIAL

## 2024-05-30 VITALS — SYSTOLIC BLOOD PRESSURE: 114 MMHG | WEIGHT: 171 LBS | BODY MASS INDEX: 27.6 KG/M2 | DIASTOLIC BLOOD PRESSURE: 74 MMHG

## 2024-05-30 DIAGNOSIS — O09.529 ANTEPARTUM MULTIGRAVIDA OF ADVANCED MATERNAL AGE: ICD-10-CM

## 2024-05-30 DIAGNOSIS — Z13.1 SCREENING FOR DIABETES MELLITUS: ICD-10-CM

## 2024-05-30 DIAGNOSIS — Z3A.27 27 WEEKS GESTATION OF PREGNANCY: Primary | ICD-10-CM

## 2024-05-30 DIAGNOSIS — Z13.0 SCREENING FOR IRON DEFICIENCY ANEMIA: ICD-10-CM

## 2024-05-30 NOTE — PROGRESS NOTES
Patient is feeling well today  She is doing her 1 hour glucose challenge test today  She feels good fetal movement  We will schedule an ultrasound for growth in about 3 weeks  She is taking her prenatal vitamin and her baby aspirin    Chief Complaint   Patient presents with    Routine Prenatal Visit     Ob Check - Pt is 27w4d, 1 hr gtt, Pt c/o feet swelling but is using compression socks & states its helping       HPI:  Pt presents for routine prenatal visit    ROS:  No fever or chills, no nausea or vomiting, no contractions, no leg pain, no LE edema, no leaking fluid, no bleeding, no headache, no dysuria  All other systems reviewed and negative    Exam:  See flow sheet  General:  Alert and oriented and no distress  Neck: no lymphadenopathy or thyromegaly  Lungs: non - labored breathing  Abd:  See flow sheet, fundus nontender  Ext: see flow sheet, non-tender bilateral , no lesions  Neuro: grossly normal    Assessment:/ PLAN:    Diagnoses and all orders for this visit:    1. 27 weeks gestation of pregnancy (Primary)  -     Cancel: POC Urinalysis Dipstick  -     RPR    2. Screening for iron deficiency anemia  -     CBC (No Diff)    3. Screening for diabetes mellitus  -     Gestational Screen 1 Hr ([facility])    4. Antepartum multigravida of advanced maternal age

## 2024-05-31 LAB
ERYTHROCYTE [DISTWIDTH] IN BLOOD BY AUTOMATED COUNT: 11.7 % (ref 12.3–15.4)
GLUCOSE 1H P 50 G GLC PO SERPL-MCNC: 143 MG/DL (ref 65–139)
HCT VFR BLD AUTO: 37.2 % (ref 34–46.6)
HGB BLD-MCNC: 12.7 G/DL (ref 12–15.9)
MCH RBC QN AUTO: 32.3 PG (ref 26.6–33)
MCHC RBC AUTO-ENTMCNC: 34.1 G/DL (ref 31.5–35.7)
MCV RBC AUTO: 94.7 FL (ref 79–97)
PLATELET # BLD AUTO: 194 10*3/MM3 (ref 140–450)
RBC # BLD AUTO: 3.93 10*6/MM3 (ref 3.77–5.28)
RPR SER QL: NON REACTIVE
WBC # BLD AUTO: 8.83 10*3/MM3 (ref 3.4–10.8)

## 2024-06-03 ENCOUNTER — LAB (OUTPATIENT)
Dept: OBSTETRICS AND GYNECOLOGY | Age: 34
End: 2024-06-03
Payer: COMMERCIAL

## 2024-06-03 DIAGNOSIS — Z13.1 SCREENING FOR DIABETES MELLITUS: Primary | ICD-10-CM

## 2024-06-05 LAB
GLUCOSE 1H P 100 G GLC PO SERPL-MCNC: 128 MG/DL (ref 70–179)
GLUCOSE 2H P 100 G GLC PO SERPL-MCNC: 114 MG/DL (ref 70–154)
GLUCOSE 3H P 100 G GLC PO SERPL-MCNC: 104 MG/DL (ref 70–139)
GLUCOSE P FAST SERPL-MCNC: 86 MG/DL (ref 70–94)
Lab: NORMAL

## 2024-06-06 ENCOUNTER — TELEPHONE (OUTPATIENT)
Dept: OBSTETRICS AND GYNECOLOGY | Age: 34
End: 2024-06-06

## 2024-06-06 NOTE — TELEPHONE ENCOUNTER
Hub staff attempted to follow warm transfer process and was unsuccessful     Caller: Daisy Tate    Relationship to patient: Self    Best call back number: [phone] CALL ANYTIME, IT IS OKAY TO LVM.    Patient is needing: PATIENT RETURNED CALL. HUB UNABLE TO WARM TRANSFER OR ADDEND DR. MURO LAB NOTE.

## 2024-06-18 ENCOUNTER — ROUTINE PRENATAL (OUTPATIENT)
Dept: OBSTETRICS AND GYNECOLOGY | Age: 34
End: 2024-06-18
Payer: COMMERCIAL

## 2024-06-18 VITALS — BODY MASS INDEX: 27.92 KG/M2 | DIASTOLIC BLOOD PRESSURE: 62 MMHG | WEIGHT: 173 LBS | SYSTOLIC BLOOD PRESSURE: 104 MMHG

## 2024-06-18 DIAGNOSIS — O09.523 MULTIGRAVIDA OF ADVANCED MATERNAL AGE IN THIRD TRIMESTER: ICD-10-CM

## 2024-06-18 DIAGNOSIS — Z3A.30 30 WEEKS GESTATION OF PREGNANCY: Primary | ICD-10-CM

## 2024-06-18 LAB
GLUCOSE UR STRIP-MCNC: NEGATIVE MG/DL
PROT UR STRIP-MCNC: ABNORMAL MG/DL

## 2024-06-18 PROCEDURE — 0502F SUBSEQUENT PRENATAL CARE: CPT | Performed by: OBSTETRICS & GYNECOLOGY

## 2024-06-18 PROCEDURE — 90715 TDAP VACCINE 7 YRS/> IM: CPT | Performed by: OBSTETRICS & GYNECOLOGY

## 2024-06-18 PROCEDURE — 90471 IMMUNIZATION ADMIN: CPT | Performed by: OBSTETRICS & GYNECOLOGY

## 2024-06-18 NOTE — PROGRESS NOTES
Patient is feeling well  No complaints  Ultrasound was done today showing the growth to be at 49th percentile  Vertex presentation  JOSEPH is 20  There is a slight pyelectasis on both kidneys 0.67 and 0.54  Well within the 1 cm limits  DTaP today  Follow-up 2 weeks    Chief Complaint   Patient presents with    Routine Prenatal Visit     Ob Check & US - Pt is 30w2d, Tdap today, Pt has no complaints today       HPI:  Pt presents for routine prenatal visit    ROS:  No fever or chills, no nausea or vomiting, no contractions, no leg pain, no LE edema, no leaking fluid, no bleeding, no headache, no dysuria  All other systems reviewed and negative    Exam:  See flow sheet  General:  Alert and oriented and no distress  Neck: no lymphadenopathy or thyromegaly  Lungs: non - labored breathing  Abd:  See flow sheet, fundus nontender  Ext: see flow sheet, non-tender bilateral , no lesions  Neuro: grossly normal    Assessment:/ PLAN:    Diagnoses and all orders for this visit:    1. 30 weeks gestation of pregnancy (Primary)  -     POC Urinalysis Dipstick    2. Multigravida of advanced maternal age in third trimester

## 2024-07-01 ENCOUNTER — ROUTINE PRENATAL (OUTPATIENT)
Dept: OBSTETRICS AND GYNECOLOGY | Age: 34
End: 2024-07-01
Payer: COMMERCIAL

## 2024-07-01 VITALS — WEIGHT: 175 LBS | DIASTOLIC BLOOD PRESSURE: 68 MMHG | BODY MASS INDEX: 28.25 KG/M2 | SYSTOLIC BLOOD PRESSURE: 106 MMHG

## 2024-07-01 DIAGNOSIS — O09.523 MULTIGRAVIDA OF ADVANCED MATERNAL AGE IN THIRD TRIMESTER: ICD-10-CM

## 2024-07-01 DIAGNOSIS — Z3A.32 32 WEEKS GESTATION OF PREGNANCY: Primary | ICD-10-CM

## 2024-07-01 DIAGNOSIS — K21.9 GASTROESOPHAGEAL REFLUX DISEASE WITHOUT ESOPHAGITIS: ICD-10-CM

## 2024-07-01 PROCEDURE — 0502F SUBSEQUENT PRENATAL CARE: CPT | Performed by: OBSTETRICS & GYNECOLOGY

## 2024-07-01 RX ORDER — PANTOPRAZOLE SODIUM 40 MG/1
40 TABLET, DELAYED RELEASE ORAL DAILY
Qty: 30 TABLET | Refills: 2 | Status: SHIPPED | OUTPATIENT
Start: 2024-07-01

## 2024-07-01 NOTE — PROGRESS NOTES
Patient feels well  She plans to bottlefeed and  Good fetal movement  She is taking the baby aspirin  She needs refill of her Protonix  Follow-up 2 weeks    Chief Complaint   Patient presents with    Routine Prenatal Visit     Ob Check - Pt is 32w1d, Pt has no complaints today, Unable to void, Doing well       HPI:  Pt presents for routine prenatal visit    ROS:  No fever or chills, no nausea or vomiting, no contractions, no leg pain, no LE edema, no leaking fluid, no bleeding, no headache, no dysuria  All other systems reviewed and negative    Exam:  See flow sheet  General:  Alert and oriented and no distress  Neck: no lymphadenopathy or thyromegaly  Lungs: non - labored breathing  Abd:  See flow sheet, fundus nontender  Ext: see flow sheet, non-tender bilateral , no lesions  Neuro: grossly normal    Assessment:/ PLAN:    Diagnoses and all orders for this visit:    1. 32 weeks gestation of pregnancy (Primary)  -     Cancel: POC Urinalysis Dipstick    2. Multigravida of advanced maternal age in third trimester    3. Gastroesophageal reflux disease without esophagitis    Other orders  -     pantoprazole (Protonix) 40 MG EC tablet; Take 1 tablet by mouth Daily.  Dispense: 30 tablet; Refill: 2

## 2024-07-16 ENCOUNTER — ROUTINE PRENATAL (OUTPATIENT)
Dept: OBSTETRICS AND GYNECOLOGY | Age: 34
End: 2024-07-16
Payer: COMMERCIAL

## 2024-07-16 VITALS — SYSTOLIC BLOOD PRESSURE: 112 MMHG | DIASTOLIC BLOOD PRESSURE: 72 MMHG | BODY MASS INDEX: 28.57 KG/M2 | WEIGHT: 177 LBS

## 2024-07-16 DIAGNOSIS — Z3A.34 34 WEEKS GESTATION OF PREGNANCY: Primary | ICD-10-CM

## 2024-07-16 DIAGNOSIS — O09.523 MULTIGRAVIDA OF ADVANCED MATERNAL AGE IN THIRD TRIMESTER: ICD-10-CM

## 2024-07-16 LAB
GLUCOSE UR STRIP-MCNC: NEGATIVE MG/DL
PROT UR STRIP-MCNC: ABNORMAL MG/DL

## 2024-07-16 NOTE — PROGRESS NOTES
Patient is feeling well  Good fetal movement  She is taking her baby aspirin  She has no complaints today  Follow-up 1 week for growth scan and BPP  GBS at follow-up    Chief Complaint   Patient presents with    Routine Prenatal Visit     Ob Check - Pt is 34w2d, Pt has no complaints today       HPI:  Pt presents for routine prenatal visit    ROS:  No fever or chills, no nausea or vomiting, no contractions, no leg pain, no LE edema, no leaking fluid, no bleeding, no headache, no dysuria  All other systems reviewed and negative    Exam:  See flow sheet  General:  Alert and oriented and no distress  Neck: no lymphadenopathy or thyromegaly  Lungs: non - labored breathing  Abd:  See flow sheet, fundus nontender  Ext: see flow sheet, non-tender bilateral , no lesions  Neuro: grossly normal    Assessment:/ PLAN:    Diagnoses and all orders for this visit:    1. 34 weeks gestation of pregnancy (Primary)  -     POC Urinalysis Dipstick    2. Multigravida of advanced maternal age in third trimester

## 2024-07-29 ENCOUNTER — ROUTINE PRENATAL (OUTPATIENT)
Dept: OBSTETRICS AND GYNECOLOGY | Age: 34
End: 2024-07-29
Payer: COMMERCIAL

## 2024-07-29 VITALS — BODY MASS INDEX: 29.05 KG/M2 | DIASTOLIC BLOOD PRESSURE: 72 MMHG | WEIGHT: 180 LBS | SYSTOLIC BLOOD PRESSURE: 118 MMHG

## 2024-07-29 DIAGNOSIS — O35.EXX0 PYELECTASIS OF FETUS ON PRENATAL ULTRASOUND: ICD-10-CM

## 2024-07-29 DIAGNOSIS — Z3A.36 36 WEEKS GESTATION OF PREGNANCY: Primary | ICD-10-CM

## 2024-07-29 DIAGNOSIS — O09.523 AMA (ADVANCED MATERNAL AGE) MULTIGRAVIDA 35+, THIRD TRIMESTER: ICD-10-CM

## 2024-07-29 DIAGNOSIS — Z36.85 ANTENATAL SCREENING FOR STREPTOCOCCUS B: ICD-10-CM

## 2024-07-29 LAB
GLUCOSE UR STRIP-MCNC: NEGATIVE MG/DL
PROT UR STRIP-MCNC: NEGATIVE MG/DL

## 2024-07-29 PROCEDURE — 0502F SUBSEQUENT PRENATAL CARE: CPT | Performed by: OBSTETRICS & GYNECOLOGY

## 2024-07-29 NOTE — PROGRESS NOTES
Patient is feeling well  No complaints  Good fetal movement and no contractions yet  GBS was done today  BPP was done due to her age  This was also a growth scan  Growth is good at 81%  JOSEPH is 17.24  BPP is 8 out of 8  Of note on the scan today the baby does have pyelectasis of the right kidney at 15 mm, the left kidney appears normal  I discussed this with the patient and we will schedule an M consult  Likely this will just need to be rechecked after the baby is born  Fetal movement counts and labor warnings given  Follow-up 1 week  Cervix is 70/1/-2  Chief Complaint   Patient presents with    Routine Prenatal Visit     Ob Check & US - Pt is 36w1d, GBS today, Pt has no complaints today       HPI:  Pt presents for routine prenatal visit    ROS:  No fever or chills, no nausea or vomiting, no contractions, no leg pain, no LE edema, no leaking fluid, no bleeding, no headache, no dysuria  All other systems reviewed and negative    Exam:  See flow sheet  General:  Alert and oriented and no distress  Neck: no lymphadenopathy or thyromegaly  Lungs: non - labored breathing  Abd:  See flow sheet, fundus nontender  Ext: see flow sheet, non-tender bilateral , no lesions  Neuro: grossly normal    Assessment:/ PLAN:    Diagnoses and all orders for this visit:    1. 36 weeks gestation of pregnancy (Primary)  -     POC Urinalysis Dipstick    2.  screening for streptococcus B  -     Group B Streptococcus Culture - Swab, Vaginal/Rectum    3. Pyelectasis of fetus on prenatal ultrasound  -     Ambulatory Referral to [facility]/Perinatology    4. AMA (advanced maternal age) multigravida 35+, third trimester

## 2024-08-01 ENCOUNTER — TRANSCRIBE ORDERS (OUTPATIENT)
Dept: ULTRASOUND IMAGING | Facility: HOSPITAL | Age: 34
End: 2024-08-01
Payer: COMMERCIAL

## 2024-08-01 DIAGNOSIS — O28.3 ABNORMAL FETAL ULTRASOUND: Primary | ICD-10-CM

## 2024-08-04 LAB — B-HEM STREP SPEC QL CULT: NEGATIVE

## 2024-08-05 ENCOUNTER — TELEPHONE (OUTPATIENT)
Dept: OBSTETRICS AND GYNECOLOGY | Facility: CLINIC | Age: 34
End: 2024-08-05

## 2024-08-05 ENCOUNTER — OFFICE VISIT (OUTPATIENT)
Dept: OBSTETRICS AND GYNECOLOGY | Facility: CLINIC | Age: 34
End: 2024-08-05
Payer: COMMERCIAL

## 2024-08-05 ENCOUNTER — HOSPITAL ENCOUNTER (OUTPATIENT)
Dept: ULTRASOUND IMAGING | Facility: HOSPITAL | Age: 34
Discharge: HOME | End: 2024-08-05
Admitting: OBSTETRICS & GYNECOLOGY
Payer: COMMERCIAL

## 2024-08-05 VITALS
WEIGHT: 184 LBS | BODY MASS INDEX: 30.66 KG/M2 | SYSTOLIC BLOOD PRESSURE: 128 MMHG | TEMPERATURE: 98.4 F | HEIGHT: 65 IN | DIASTOLIC BLOOD PRESSURE: 77 MMHG | HEART RATE: 98 BPM | OXYGEN SATURATION: 99 %

## 2024-08-05 DIAGNOSIS — O28.3 ABNORMAL FETAL ULTRASOUND: ICD-10-CM

## 2024-08-05 DIAGNOSIS — O35.EXX0 PYELECTASIS OF FETUS ON PRENATAL ULTRASOUND: Primary | ICD-10-CM

## 2024-08-05 PROCEDURE — 76811 OB US DETAILED SNGL FETUS: CPT

## 2024-08-05 PROCEDURE — 76819 FETAL BIOPHYS PROFIL W/O NST: CPT

## 2024-08-05 PROCEDURE — 99214 OFFICE O/P EST MOD 30 MIN: CPT | Performed by: NURSE PRACTITIONER

## 2024-08-05 NOTE — TELEPHONE ENCOUNTER
Voicemail was left with [facility] in regards to getting Daisy scheduled for a stat consult at 37w1d due to enlarged fetal bladder, RPD, and dilated ureter. Encouraged [facility] to call [facility] back at [phone] to hopefully get patient scheduled within the week.

## 2024-08-05 NOTE — PROGRESS NOTES
MATERNAL FETAL MEDICINE CONSULT Note    Dear Dr Marycruz Blankenship*:    Thank you for your kind referral of Daisy Tate.  As you know, she is a 34 y.o.   37w1d gestation (Estimated Date of Delivery: 24). This is a consult.     Her antepartum course is complicated by:  Renal Pelvis Dilation - Right - 15 mm on outside scan at primary OB  AMA    Aneuploidy Screening:Panorama - Low Risk  Jennifer Panorama Prenatal Test: Chromosomes 13, 18, 21, X & Y: Triploidy 22Q.11.2 Deletion - Blood, (2024 10:55)   Carrier Screening: Carrier for CF  Jennifer Horizon 14 (Pan-Ethnic Standard) - Blood, (2024 10:56)     HPI: Today, she denies headache, blurry vision, RUQ pain. No vaginal bleeding, no contractions.     Review of History:  Past Medical History:   Diagnosis Date    Encounter for insertion of mirena IUD     History of fainting spells of unknown cause     Migraine     Pregnancy, ectopic, tubal      Past Surgical History:   Procedure Laterality Date    DIAGNOSTIC LAPAROSCOPY Left 11/10/2016    Procedure:  LAPAROSCOPIC LEFT SALPINGECTOMY ;  Surgeon: Marycruz Edwards MD;  Location: [facility];  Service:     MOUTH SURGERY         Social History     Socioeconomic History    Marital status:      Spouse name: WOODROW    Number of children: 0   Tobacco Use    Smoking status: Never    Smokeless tobacco: Never   Substance and Sexual Activity    Alcohol use: No    Drug use: No    Sexual activity: Yes     Partners: Male     Birth control/protection: None     Comment: spouse = Woodrow     History reviewed. No pertinent family history.   Allergies   Allergen Reactions    Prednisone Other (See Comments)     ANYTHING CONTAINING STEROIDS.  Makes patient angry and self destructive.      Current Outpatient Medications on File Prior to Visit   Medication Sig Dispense Refill    aspirin 81 MG EC tablet Take 1 tablet by mouth Daily. 90 tablet 2    pantoprazole (Protonix) 40 MG EC tablet Take 1 tablet  "by mouth Daily. 30 tablet 2    Prenatal Vit-Fe Fumarate-FA (prenatal vitamin 27-0.8) 27-0.8 MG tablet tablet Take 1 tablet by mouth Daily.       No current facility-administered medications on file prior to visit.        Past obstetric, gynecological, medical, surgical, family and social history reviewed.  Relevant lab work and imaging reviewed.    Review of systems  Constitutional:  denies fever, chills, malaise.   ENT/Mouth:  denies sore throat, tinnitus  Eyes: denies vision changes/pain  CV:  denies chest pain  Respiratory:  denies cough/SOB  GI:  denies N/V, diarrhea, abdominal pain.    :   denies dysuria  Skin:  denies lesions or pruritus   Neuro:  denies weakness, focal neurologic symptoms    Vitals:    24 0840   BP: 128/77   BP Location: Right arm   Patient Position: Sitting   Pulse: 98   Temp: 98.4 °F (36.9 °C)   TempSrc: Temporal   SpO2: 99%   Weight: 83.5 kg (184 lb)   Height: 165.1 cm (65\")     PHYSICAL EXAM   GENERAL: Not in acute distress, AAOx3, pleasant  CARDIO: regular rate and rhythm  PULM: symmetric chest rise, speaking in complete sentences without difficulty  NEURO: awake, alert and oriented to person, place, and time  ABDOMINAL: No fundal tenderness, no rebound or guarding, gravid  EXTREMITIES: no bilateral lower extremity edema/tenderness  SKIN: Warm, well-perfused      ULTRASOUND   Please view full ultrasound note on Imaging tab in ViewPoint.  Cephalic presentation  Anterior placenta  JOSEPH 11 cm, which is normal.  EFW 3006 g (44% AC 71%)  BPP 8/8  Sub-optimal view of anatomy due to late gestational age.    Abnormal kidneys and bladder-bladder appears enlarged. Bilateral renal pelvis dilation , and calyceal dilation.   Right renal pelvis 9.9 mm, right ureter 7.8 mm. Left renal pelvis 6.9 mm, left ureter 7.4 mm      ASSESSMENT/COUNSELIN y.o.   37w1d gestation (Estimated Date of Delivery: 24).    -Pregnancy  [ X ] stable  [   ] improving [  ] worsening    Diagnoses and " all orders for this visit:    1. Pyelectasis of fetus on prenatal ultrasound (Primary)  -     Ambulatory Referral to Pediatric Urology       FETAL URINARY TRACT DILATION, SEVERE WITH CALYCEAL DILATION/HYDRONEPHROSIS AND ENLARGED BLADDER  Her US today is reassuring overall, severe urinary tract dilation (UTD) was identified.  The patient was counseled that UTD represents dilation of the renal pelvis and may be unilateral or bilateral.  This finding is identified in approximately 2-3% of prenatal ultrasounds and is more common in males than females.  It can be a normal variant or can reflect a structural renal abnormality.  In her setting, this is almost certainly an obstructive process.  The right side is dilated at 9.9 mm and the right ureter is dilated at 7.8 mm. The left renal pelvis is 7 mm and the left ureter is 7.4 mm. The bladder also appears enlarged.   We discussed the association between UTD and aneuploidy (including Trisomy 21), but I reassured her that her risk of this was exceptionally low as she has a low risk cell free DNA.  Thus, no further genetic screening is recommended given this finding alone.  In the absence of other sonographic findings, a positive family history, advanced maternal age or an abnormal serum screen, the likelihood of fetal Trisomy 21 with isolated renal pelvis dilatation is <~1%.       Otherwise prenatally diagnosed cases of severe UTD often require surgery to remove obstruction and have variable degrees of damage to that kidney.  We discussed that sometimes that kidney does not work at all and sometimes it has some function.  Given the ureteral dilation, as well as this severe hydronephrosis, we will send a STAT consult to urology.  We also discussed that she may need to deliver at [place] so that she can get the VCUG shortly after delivery--ultimately this will be up to urology, but I anticipate this will be what is recommended. Our office nurse, KRISTIE De Jesus, did speak to Dr Patricia  Ronny in the NICU who said she could deliver at [facility] and have the VCUG done outpatient if needed.  We discussed that I overall expect her baby to do well as she has normal fluid--indicating that her baby has some normal kidney function.   We discussed that individuals with one kidney need urology follow up and may require monitoring via different imaging modalities, have a higher risk of infection in the urinary tract and may need prophylactic antibotics, and may require surgery, but overall I don't anticipate this to be a life limiting diagnosis unless an unexpected genetic anomaly is diagnosed--I do not suspect this based on US images and normal cell free DNA but can never totally rule this out.       All patient's questions were answered.      Summary of Plan  - STAT Urology Consult (Scheduled for 24 at 1230 via telehealth)  -Continue routine prenatal care with primary OB  -Weekly fetal  surveillance until delivery (Primary OB)  -Starting at 28 weeks: Fetal movement instructions given continue daily until delivery; instructed to report to labor and delivery if cannot achieve more than 10 kicks in one hour or if she perceives a decrease in fetal movement  - Recommend delivery at 39 weeks    Follow-up: Dr Jha will have telehealth appt with patient this afternoon to answer any additional questions patient may have.   Patient to follow up in 1 week to discuss urology consult and recommendations.     Thank you for the consult and opportunity to care for this patient.  Please feel free to reach out with any questions or concerns.      I spent 35 minutes caring for this patient on this date of service. This time includes time spent by me in the following activities: preparing for the visit, reviewing tests, obtaining and/or reviewing a separately obtained history, performing a medically appropriate examination and/or evaluation, counseling and educating the patient/family/caregiver and  independently interpreting results and communicating that information with the patient/family/caregiver with greater than 50% spent in counseling and coordination of care.     BRAYAN Vazquez  Maternal Fetal Medicine-[facility]  Office: [phone]  [email or web address]

## 2024-08-05 NOTE — LETTER
2024     Marycruz Blankenship MD  [address] [facility] [facility]  [address]  [place] [id number]    Patient: Daisy Tate   YOB: 1990   Date of Visit: 2024       Dear Marycruz Blankenship MD,    Thank you for referring Daisy Tate to me for evaluation. Below is a copy of my consult note.    If you have questions, please do not hesitate to call me. I look forward to following Daisy along with you.         Sincerely,        BRAYAN Grimes        CC: No Recipients                          Pt reports that she is doing well and denies vaginal bleeding, cramping, contractions or LOF at this time. Reports active fetal movement. Reviewed when to call OB office or present to L&D for evaluation with symptoms such as decreased fetal movement, vaginal bleeding, LOF or ctxs. Pt verbalized understanding. Denies HA, visual changes or epigastric pain. Denies any additional complaints at time of appointment. Next OB appointment scheduled for 2024.    Vitals:    24 0840   BP: 128/77   Pulse: 98   Temp: 98.4 °F (36.9 °C)   SpO2: 99%          MATERNAL FETAL MEDICINE CONSULT Note    Dear Dr Marycruz Blankenship*:    Thank you for your kind referral of Daisy Tate.  As you know, she is a 34 y.o.   37w1d gestation (Estimated Date of Delivery: 24). This is a consult.     Her antepartum course is complicated by:  Renal Pelvis Dilation - Right - 15 mm on outside scan at primary OB  AMA    Aneuploidy Screening:Panorama - Low Risk  Jennifer Panorama Prenatal Test: Chromosomes 13, 18, 21, X & Y: Triploidy 22Q.11.2 Deletion - Blood, (2024 10:55)   Carrier Screening: Carrier for CF  Jennifer Horizon 14 (Pan-Ethnic Standard) - Blood, (2024 10:56)     HPI: Today, she denies headache, blurry vision, RUQ pain. No vaginal bleeding, no contractions.     Review of History:  Past Medical History:   Diagnosis Date   • Encounter for insertion of mirena IUD    • History  of fainting spells of unknown cause    • Migraine    • Pregnancy, ectopic, tubal 2016     Past Surgical History:   Procedure Laterality Date   • DIAGNOSTIC LAPAROSCOPY Left 11/10/2016    Procedure:  LAPAROSCOPIC LEFT SALPINGECTOMY ;  Surgeon: Marycruz Edwards MD;  Location: [facility] OR;  Service:    • MOUTH SURGERY         Social History     Socioeconomic History   • Marital status:      Spouse name: WONG   • Number of children: 0   Tobacco Use   • Smoking status: Never   • Smokeless tobacco: Never   Substance and Sexual Activity   • Alcohol use: No   • Drug use: No   • Sexual activity: Yes     Partners: Male     Birth control/protection: None     Comment: spouse = Wong     History reviewed. No pertinent family history.   Allergies   Allergen Reactions   • Prednisone Other (See Comments)     ANYTHING CONTAINING STEROIDS.  Makes patient angry and self destructive.      Current Outpatient Medications on File Prior to Visit   Medication Sig Dispense Refill   • aspirin 81 MG EC tablet Take 1 tablet by mouth Daily. 90 tablet 2   • pantoprazole (Protonix) 40 MG EC tablet Take 1 tablet by mouth Daily. 30 tablet 2   • Prenatal Vit-Fe Fumarate-FA (prenatal vitamin 27-0.8) 27-0.8 MG tablet tablet Take 1 tablet by mouth Daily.       No current facility-administered medications on file prior to visit.        Past obstetric, gynecological, medical, surgical, family and social history reviewed.  Relevant lab work and imaging reviewed.    Review of systems  Constitutional:  denies fever, chills, malaise.   ENT/Mouth:  denies sore throat, tinnitus  Eyes: denies vision changes/pain  CV:  denies chest pain  Respiratory:  denies cough/SOB  GI:  denies N/V, diarrhea, abdominal pain.    :   denies dysuria  Skin:  denies lesions or pruritus   Neuro:  denies weakness, focal neurologic symptoms    Vitals:    08/05/24 0840   BP: 128/77   BP Location: Right arm   Patient Position: Sitting   Pulse: 98   Temp: 98.4 °F (36.9  "°C)   TempSrc: Temporal   SpO2: 99%   Weight: 83.5 kg (184 lb)   Height: 165.1 cm (65\")     PHYSICAL EXAM   GENERAL: Not in acute distress, AAOx3, pleasant  CARDIO: regular rate and rhythm  PULM: symmetric chest rise, speaking in complete sentences without difficulty  NEURO: awake, alert and oriented to person, place, and time  ABDOMINAL: No fundal tenderness, no rebound or guarding, gravid  EXTREMITIES: no bilateral lower extremity edema/tenderness  SKIN: Warm, well-perfused      ULTRASOUND   Please view full ultrasound note on Imaging tab in ViewPoint.  Cephalic presentation  Anterior placenta  JOSEPH 11 cm, which is normal.  EFW 3006 g (44% AC 71%)  BPP   Sub-optimal view of anatomy due to late gestational age.    Abnormal kidneys and bladder-bladder appears enlarged. Bilateral renal pelvis dilation , and calyceal dilation.   Right renal pelvis 9.9 mm, right ureter 7.8 mm. Left renal pelvis 6.9 mm, left ureter 7.4 mm      ASSESSMENT/COUNSELIN y.o.   37w1d gestation (Estimated Date of Delivery: 24).    -Pregnancy  [ X ] stable  [   ] improving [  ] worsening    Diagnoses and all orders for this visit:    1. Pyelectasis of fetus on prenatal ultrasound (Primary)  -     Ambulatory Referral to Pediatric Urology       FETAL URINARY TRACT DILATION, SEVERE WITH CALYCEAL DILATION/HYDRONEPHROSIS AND ENLARGED BLADDER  Her US today is reassuring overall, severe urinary tract dilation (UTD) was identified.  The patient was counseled that UTD represents dilation of the renal pelvis and may be unilateral or bilateral.  This finding is identified in approximately 2-3% of prenatal ultrasounds and is more common in males than females.  It can be a normal variant or can reflect a structural renal abnormality.  In her setting, this is almost certainly an obstructive process.  The right side is dilated at 9.9 mm and the right ureter is dilated at 7.8 mm. The left renal pelvis is 7 mm and the left ureter is 7.4 mm. " The bladder also appears enlarged.   We discussed the association between UTD and aneuploidy (including Trisomy 21), but I reassured her that her risk of this was exceptionally low as she has a low risk cell free DNA.  Thus, no further genetic screening is recommended given this finding alone.  In the absence of other sonographic findings, a positive family history, advanced maternal age or an abnormal serum screen, the likelihood of fetal Trisomy 21 with isolated renal pelvis dilatation is <~1%.       Otherwise prenatally diagnosed cases of severe UTD often require surgery to remove obstruction and have variable degrees of damage to that kidney.  We discussed that sometimes that kidney does not work at all and sometimes it has some function.  Given the ureteral dilation, as well as this severe hydronephrosis, we will send a STAT consult to urology.  We also discussed that she may need to deliver at [place] so that she can get the VCUG shortly after delivery--ultimately this will be up to urology, but I anticipate this will be what is recommended. Our office nurse, KRISTIE De Jesus, did speak to Dr Harshad Jefferson in the NICU who said she could deliver at [facility] and have the VCUG done outpatient if needed.  We discussed that I overall expect her baby to do well as she has normal fluid--indicating that her baby has some normal kidney function.   We discussed that individuals with one kidney need urology follow up and may require monitoring via different imaging modalities, have a higher risk of infection in the urinary tract and may need prophylactic antibotics, and may require surgery, but overall I don't anticipate this to be a life limiting diagnosis unless an unexpected genetic anomaly is diagnosed--I do not suspect this based on US images and normal cell free DNA but can never totally rule this out.       All patient's questions were answered.      Summary of Plan  - STAT Urology Consult (Scheduled for 8/8/24  at 1230 via telehealth)  -Continue routine prenatal care with primary OB  -Weekly fetal  surveillance until delivery (Primary OB)  -Starting at 28 weeks: Fetal movement instructions given continue daily until delivery; instructed to report to labor and delivery if cannot achieve more than 10 kicks in one hour or if she perceives a decrease in fetal movement  - Recommend delivery at 39 weeks    Follow-up: Dr Jha will have telehealth appt with patient this afternoon to answer any additional questions patient may have.   Patient to follow up in 1 week to discuss urology consult and recommendations.     Thank you for the consult and opportunity to care for this patient.  Please feel free to reach out with any questions or concerns.      I spent 35 minutes caring for this patient on this date of service. This time includes time spent by me in the following activities: preparing for the visit, reviewing tests, obtaining and/or reviewing a separately obtained history, performing a medically appropriate examination and/or evaluation, counseling and educating the patient/family/caregiver and independently interpreting results and communicating that information with the patient/family/caregiver with greater than 50% spent in counseling and coordination of care.     BRAYAN Vazquez  Maternal Fetal Medicine-[facility]  Office: [phone]  [email or web address]

## 2024-08-05 NOTE — PROGRESS NOTES
Pt reports that she is doing well and denies vaginal bleeding, cramping, contractions or LOF at this time. Reports active fetal movement. Reviewed when to call OB office or present to L&D for evaluation with symptoms such as decreased fetal movement, vaginal bleeding, LOF or ctxs. Pt verbalized understanding. Denies HA, visual changes or epigastric pain. Denies any additional complaints at time of appointment. Next OB appointment scheduled for 08/06/2024.    Vitals:    08/05/24 0840   BP: 128/77   Pulse: 98   Temp: 98.4 °F (36.9 °C)   SpO2: 99%

## 2024-08-05 NOTE — TELEPHONE ENCOUNTER
Dr. Jefferson notified of Daisy and fetal ultrasound findings at initial [facility] appointment today, 8/5. Ultrasound shows enlarged bladder, RPD, and dilated right ureter.     Dr. Jefferson reports Daisy should be able to deliver at [facility] and get VICKIE @48 hours as long as the parents understand the possibility for transfer should baby need vcug prior to discharge.     RN to call urology to see if we can get Daisy scheduled for stat consult.

## 2024-08-05 NOTE — TELEPHONE ENCOUNTER
Call placed to Daisy to notify her of pediatric urology appointment scheduled for this Thursday, 8/8 at 12:30 via telehealth. Daisy reports she uses [facility] frequently and is aware they will do her visit through [email or web address]. No additional questions at this time. BRAYAN Vazquez, notified of appointment.

## 2024-08-06 ENCOUNTER — TRANSCRIBE ORDERS (OUTPATIENT)
Dept: ULTRASOUND IMAGING | Facility: HOSPITAL | Age: 34
End: 2024-08-06
Payer: COMMERCIAL

## 2024-08-06 ENCOUNTER — ROUTINE PRENATAL (OUTPATIENT)
Dept: OBSTETRICS AND GYNECOLOGY | Age: 34
End: 2024-08-06
Payer: COMMERCIAL

## 2024-08-06 VITALS — BODY MASS INDEX: 30.45 KG/M2 | SYSTOLIC BLOOD PRESSURE: 118 MMHG | WEIGHT: 183 LBS | DIASTOLIC BLOOD PRESSURE: 76 MMHG

## 2024-08-06 DIAGNOSIS — O35.EXX0 PYELECTASIS OF FETUS ON PRENATAL ULTRASOUND: ICD-10-CM

## 2024-08-06 DIAGNOSIS — Z3A.37 37 WEEKS GESTATION OF PREGNANCY: Primary | ICD-10-CM

## 2024-08-06 DIAGNOSIS — O28.3 ABNORMAL FETAL ULTRASOUND: Primary | ICD-10-CM

## 2024-08-06 DIAGNOSIS — O09.523 AMA (ADVANCED MATERNAL AGE) MULTIGRAVIDA 35+, THIRD TRIMESTER: ICD-10-CM

## 2024-08-06 LAB
GLUCOSE UR STRIP-MCNC: NEGATIVE MG/DL
PROT UR STRIP-MCNC: NEGATIVE MG/DL

## 2024-08-06 PROCEDURE — 0502F SUBSEQUENT PRENATAL CARE: CPT | Performed by: OBSTETRICS & GYNECOLOGY

## 2024-08-06 NOTE — PROGRESS NOTES
Patient is feeling well  She is having some contractions  Ultrasound was done today showing a BPP 8/8  Vertex presentation  JOSEPH is 18  Noted MFM consult regarding her  fetal renal pyelectasis  Patient has urology consult in 2 days  Plan induction of labor at 39 weeks  Labor warnings and fetal movement counts  Follow-up 1 week    Chief Complaint   Patient presents with    Routine Prenatal Visit     Ob Check & US - Pt is 37w2d, Pt had an episode of Rt shoulder pain with chest tightness a few days ago but states it has not happened since, Pt stating she had some spontaneous abdominal cramping yesterday as well       HPI:  Pt presents for routine prenatal visit    ROS:  No fever or chills, no nausea or vomiting, no contractions, no leg pain, no LE edema, no leaking fluid, no bleeding, no headache, no dysuria  All other systems reviewed and negative    Exam:  See flow sheet  General:  Alert and oriented and no distress  Neck: no lymphadenopathy or thyromegaly  Lungs: non - labored breathing  Abd:  See flow sheet, fundus nontender  Ext: see flow sheet, non-tender bilateral , no lesions  Neuro: grossly normal    Assessment:/ PLAN:    Diagnoses and all orders for this visit:    1. 37 weeks gestation of pregnancy (Primary)  -     POC Urinalysis Dipstick    2. AMA (advanced maternal age) multigravida 35+, third trimester    3. Pyelectasis of fetus on prenatal ultrasound

## 2024-08-12 ENCOUNTER — PATIENT ROUNDING (BHMG ONLY) (OUTPATIENT)
Dept: OBSTETRICS AND GYNECOLOGY | Facility: CLINIC | Age: 34
End: 2024-08-12
Payer: COMMERCIAL

## 2024-08-12 ENCOUNTER — ROUTINE PRENATAL (OUTPATIENT)
Dept: OBSTETRICS AND GYNECOLOGY | Age: 34
End: 2024-08-12
Payer: COMMERCIAL

## 2024-08-12 VITALS — WEIGHT: 186 LBS | BODY MASS INDEX: 30.95 KG/M2 | DIASTOLIC BLOOD PRESSURE: 72 MMHG | SYSTOLIC BLOOD PRESSURE: 110 MMHG

## 2024-08-12 DIAGNOSIS — O35.EXX0 PYELECTASIS OF FETUS ON PRENATAL ULTRASOUND: ICD-10-CM

## 2024-08-12 DIAGNOSIS — Z3A.38 38 WEEKS GESTATION OF PREGNANCY: Primary | ICD-10-CM

## 2024-08-12 DIAGNOSIS — O09.523 AMA (ADVANCED MATERNAL AGE) MULTIGRAVIDA 35+, THIRD TRIMESTER: ICD-10-CM

## 2024-08-12 LAB
GLUCOSE UR STRIP-MCNC: NEGATIVE MG/DL
PROT UR STRIP-MCNC: NEGATIVE MG/DL

## 2024-08-12 PROCEDURE — 0502F SUBSEQUENT PRENATAL CARE: CPT | Performed by: OBSTETRICS & GYNECOLOGY

## 2024-08-14 ENCOUNTER — HOSPITAL ENCOUNTER (OUTPATIENT)
Dept: ULTRASOUND IMAGING | Facility: HOSPITAL | Age: 34
Discharge: HOME | End: 2024-08-14
Admitting: OBSTETRICS & GYNECOLOGY
Payer: COMMERCIAL

## 2024-08-14 ENCOUNTER — OFFICE VISIT (OUTPATIENT)
Dept: OBSTETRICS AND GYNECOLOGY | Facility: CLINIC | Age: 34
End: 2024-08-14
Payer: COMMERCIAL

## 2024-08-14 ENCOUNTER — DOCUMENTATION (OUTPATIENT)
Dept: OBSTETRICS AND GYNECOLOGY | Facility: CLINIC | Age: 34
End: 2024-08-14

## 2024-08-14 VITALS
DIASTOLIC BLOOD PRESSURE: 61 MMHG | TEMPERATURE: 97.5 F | SYSTOLIC BLOOD PRESSURE: 117 MMHG | WEIGHT: 186.6 LBS | HEART RATE: 86 BPM | BODY MASS INDEX: 31.05 KG/M2

## 2024-08-14 DIAGNOSIS — O35.EXX0 PYELECTASIS OF FETUS ON PRENATAL ULTRASOUND: ICD-10-CM

## 2024-08-14 DIAGNOSIS — O35.EXX0 PYELECTASIS OF FETUS ON PRENATAL ULTRASOUND: Primary | ICD-10-CM

## 2024-08-14 DIAGNOSIS — O09.523 AMA (ADVANCED MATERNAL AGE) MULTIGRAVIDA 35+, THIRD TRIMESTER: ICD-10-CM

## 2024-08-14 PROCEDURE — 76819 FETAL BIOPHYS PROFIL W/O NST: CPT

## 2024-08-14 NOTE — PROGRESS NOTES
Message sent to Dr. Jefferson from NICU to provide update for Daisy. Dr. Jefferson aware pt saw urology last week for stat consult where they encouraged her to consider transfer to [place] in case baby needed quicker evaluation/vcug prior to discharge. Pt is adamant about delivering at [facility] and is aware that if baby needs further urology evaluation, baby would be transferred to [place] and is ok with that should it happen. Otherwise, baby can be sent home on antibiotics and vcug can be done outpatient.     Dr. Jefferson notified and is ok for baby to be delivered at [facility]. Will transfer baby to [place] if needed. BRAYAN Vazquez notified.

## 2024-08-14 NOTE — PROGRESS NOTES
MATERNAL FETAL MEDICINE CONSULT Note    Dear Dr Marycruz Blankenship*:    Thank you for your kind referral of Daisy Tate.  As you know, she is a 34 y.o.   38w3d gestation (Estimated Date of Delivery: 24). This is a consult.     Her antepartum course is complicated by:  Renal Pelvis Dilation - Right - 15 mm on outside scan at primary OB  AMA    Aneuploidy Screening:Panorama - Low Risk  Jennifer Panorama Prenatal Test: Chromosomes 13, 18, 21, X & Y: Triploidy 22Q.11.2 Deletion - Blood, (2024 10:55)   Carrier Screening: Carrier for CF  Jennifer Horizon 14 (Pan-Ethnic Standard) - Blood, (2024 10:56)     HPI: Today, she denies headache, blurry vision, RUQ pain. No vaginal bleeding, no contractions.     Review of History:  Past Medical History:   Diagnosis Date    Encounter for insertion of mirena IUD     History of fainting spells of unknown cause     Migraine     Pregnancy, ectopic, tubal      Past Surgical History:   Procedure Laterality Date    DIAGNOSTIC LAPAROSCOPY Left 11/10/2016    Procedure:  LAPAROSCOPIC LEFT SALPINGECTOMY ;  Surgeon: Marycruz Edwards MD;  Location: [facility];  Service:     MOUTH SURGERY         Social History     Socioeconomic History    Marital status:      Spouse name: WOODROW    Number of children: 0   Tobacco Use    Smoking status: Never     Passive exposure: Never    Smokeless tobacco: Never   Vaping Use    Vaping status: Never Used   Substance and Sexual Activity    Alcohol use: No    Drug use: No    Sexual activity: Yes     Partners: Male     Birth control/protection: None     Comment: spouse = Woodrow     History reviewed. No pertinent family history.   Allergies   Allergen Reactions    Prednisone Other (See Comments)     ANYTHING CONTAINING STEROIDS.  Makes patient angry and self destructive.      Current Outpatient Medications on File Prior to Visit   Medication Sig Dispense Refill    aspirin 81 MG EC tablet Take 1 tablet by mouth Daily.  90 tablet 2    pantoprazole (Protonix) 40 MG EC tablet Take 1 tablet by mouth Daily. 30 tablet 2    Prenatal Vit-Fe Fumarate-FA (prenatal vitamin 27-0.8) 27-0.8 MG tablet tablet Take 1 tablet by mouth Daily.       No current facility-administered medications on file prior to visit.        Past obstetric, gynecological, medical, surgical, family and social history reviewed.  Relevant lab work and imaging reviewed.    Review of systems  Constitutional:  denies fever, chills, malaise.   ENT/Mouth:  denies sore throat, tinnitus  Eyes: denies vision changes/pain  CV:  denies chest pain  Respiratory:  denies cough/SOB  GI:  denies N/V, diarrhea, abdominal pain.    :   denies dysuria  Skin:  denies lesions or pruritus   Neuro:  denies weakness, focal neurologic symptoms    Vitals:    24 0744   BP: 117/61   BP Location: Right arm   Patient Position: Sitting   Pulse: 86   Temp: 97.5 °F (36.4 °C)   TempSrc: Temporal   Weight: 84.6 kg (186 lb 9.6 oz)     PHYSICAL EXAM   GENERAL: Not in acute distress, AAOx3, pleasant  CARDIO: regular rate and rhythm  PULM: symmetric chest rise, speaking in complete sentences without difficulty  NEURO: awake, alert and oriented to person, place, and time  ABDOMINAL: No fundal tenderness, no rebound or guarding, gravid  EXTREMITIES: no bilateral lower extremity edema/tenderness  SKIN: Warm, well-perfused      ULTRASOUND   Please view full ultrasound note on Imaging tab in ViewPoint.  Cephalic presentation  Anterior placenta  JOSEPH 11 cm, which is normal.  BPP 8/8  Sub-optimal view of anatomy due to late gestational age.    Abnormal kidneys and bladder-bladder appears enlarged. Bilateral renal pelvis dilation, and calyceal dilation.   Right renal pelvis 11 mm, right ureter 6.6 mm. Left renal pelvis 10.2 mm, left ureter 6.9 mm      ASSESSMENT/COUNSELIN y.o.   38w3d gestation (Estimated Date of Delivery: 24).    -Pregnancy  [ X ] stable  [   ] improving [  ]  worsening    Diagnoses and all orders for this visit:    1. Pyelectasis of fetus on prenatal ultrasound (Primary)    2. AMA (advanced maternal age) multigravida 35+, third trimester       FETAL URINARY TRACT DILATION, SEVERE WITH CALYCEAL DILATION/HYDRONEPHROSIS AND ENLARGED BLADDER  Her US today is reassuring overall, severe urinary tract dilation (UTD) was identified.  The patient was counseled that UTD represents dilation of the renal pelvis and may be unilateral or bilateral.  This finding is identified in approximately 2-3% of prenatal ultrasounds and is more common in males than females.  It can be a normal variant or can reflect a structural renal abnormality.  In her setting, this is probably an obstructive process.  The right side is dilated at 11 mm and the right ureter is dilated at 6.6 mm. The left renal pelvis is 10.2 mm and the left ureter is 6.9 mm. The bladder also appears enlarged.  Dr Vance reviewed these images and feels like it looks stable to better compared to last week. We discussed the association between UTD and aneuploidy (including Trisomy 21), but I reassured her that her risk of this was exceptionally low as she has a low risk cell free DNA.  Thus, no further genetic screening is recommended given this finding alone.  In the absence of other sonographic findings, a positive family history, advanced maternal age or an abnormal serum screen, the likelihood of fetal Trisomy 21 with isolated renal pelvis dilatation is <~1%.       Otherwise prenatally diagnosed cases of severe UTD often require surgery to remove obstruction and have variable degrees of damage to that kidney.  We discussed that sometimes that kidney does not work at all and sometimes it has some function.  Given the ureteral dilation, as well as this severe hydronephrosis, we will send a STAT consult to urology.  We also discussed that she may need to deliver at [place] so that she can get the VCUG shortly after  "delivery--ultimately this will be up to urology, but I anticipate this will be what is recommended. Our office nurse, KRISTIE De Jesus, did speak to Dr Harshad Jefferson in the NICU who said she could deliver at [facility] and have the VCUG done outpatient if needed.  We discussed that I overall expect her baby to do well as she has normal fluid--indicating that her baby has some normal kidney function.   We discussed that individuals with one kidney need urology follow up and may require monitoring via different imaging modalities, have a higher risk of infection in the urinary tract and may need prophylactic antibotics, and may require surgery, but overall I don't anticipate this to be a life limiting diagnosis unless an unexpected genetic anomaly is diagnosed--I do not suspect this based on US images and normal cell free DNA but can never totally rule this out.       All patient's questions were answered.    PEDS UROLOGY CONSULT  Scan on 8/12/2024 1621 by [employer], [place]: RT FETAL HYDRONEPHROSIS, [facility], 08/08/2024   From note by BRYAAN Lorenzo -- \"Due to bilateral renal dilation and distended bladder noted on prenatal ultrasound performed at 37 weeks, I discussed with the patient's mother that she may want to consider delivering at [facility] in order to facilitate a VCUG soon after birth and consultation with the pediatric urologist on-call after the baby is born due to concerns of possible obstructive uropathy. Will discuss with her [facility] doctors and plan for delivery at [facility], if there is persistent bilateral hydronephrosis and bladder distention.\"      Pt states she did speak with Dr Jha last week from [facility] via telehealth and he confirmed the information I had given her and reassured her.   Pt had peds urology consult last Thursday.   Pt reports that the NP from [facility] called her Monday 08/12/24 and discussed with her considering delivery at [facility]. She " apparently had just got ultrasound images and was able to review them which was the reason for new recommendation. Pt is not interested in delivering at [facility]. She wants to deliver at [facility]. She is aware that we can not do VCUG here, but that we can get renal ultrasound, send baby home on antibiotics and schedule VCUG outpatient. Pt would like to do this. She is aware that there is a chance baby may need to be transferred and she is ok with this if it is necessary but she would like to deliver at [facility]. Dr Jefferson in NICU is aware that recommendation to deliver at [facility] was made and that patient does not wish to do this. He said that is fine and we can transfer baby if needed. Mom is agreeable to this plan.       Summary of Plan  -S/P Urology Consult  -Continue routine prenatal care with primary OB  -Weekly fetal  surveillance until delivery (Primary OB)  -Starting at 28 weeks: Fetal movement instructions given continue daily until delivery; instructed to report to labor and delivery if cannot achieve more than 10 kicks in one hour or if she perceives a decrease in fetal movement  - Recommend delivery at 39 weeks    Follow-up:No follow up with MFM scheduled, but I am happy to see for follow up at request of primary obstetrician    Thank you for the consult and opportunity to care for this patient.  Please feel free to reach out with any questions or concerns.      I spent 30 minutes caring for this patient on this date of service. This time includes time spent by me in the following activities: preparing for the visit, reviewing tests, obtaining and/or reviewing a separately obtained history, performing a medically appropriate examination and/or evaluation, counseling and educating the patient/family/caregiver and independently interpreting results and communicating that information with the patient/family/caregiver with greater than 50% spent in counseling and  coordination of care.     BRAYAN Vazquez  Maternal Fetal Medicine-[facility]  Office: [phone]  [email or web address]

## 2024-08-14 NOTE — LETTER
2024     Marycruz Blankenship MD  [address] [facility]  [address]  [place] [id number]    Patient: Daisy Tate   YOB: 1990   Date of Visit: 2024       Dear Marycruz Blankenship MD    Daisy Tate was in my office today. Below is a copy of my note.    If you have questions, please do not hesitate to call me. I look forward to following Daisy along with you.         Sincerely,        BRAYAN Grimes        CC: No Recipients                          Pt reports that she is doing well and denies vaginal bleeding or LOF at this time. Notes irregular lower abdominal/back cramping, denies consistency. Reports active fetal movement. Reviewed when to call OB office or present to L&D for evaluation with symptoms such as decreased fetal movement, vaginal bleeding, LOF or ctxs. Pt verbalized understanding. Denies HA, visual changes or epigastric pain. Denies any additional complaints at time of appointment. Next OB appointment scheduled for .    Vitals:    24 0744   BP: 117/61   Pulse: 86   Temp: 97.5 °F (36.4 °C)          MATERNAL FETAL MEDICINE CONSULT Note    Dear Dr Marycruz Blankenship*:    Thank you for your kind referral of Daisy Tate.  As you know, she is a 34 y.o.   38w3d gestation (Estimated Date of Delivery: 24). This is a consult.     Her antepartum course is complicated by:  Renal Pelvis Dilation - Right - 15 mm on outside scan at primary OB  AMA    Aneuploidy Screening:Panorama - Low Risk  [place] Panorama Prenatal Test: Chromosomes 13, 18, 21, X & Y: Triploidy 22Q.11.2 Deletion - Blood, (2024 10:55)   Carrier Screening: Carrier for CF  [place] Horizon 14 (Pan-Ethnic Standard) - Blood, (2024 10:56)     HPI: Today, she denies headache, blurry vision, RUQ pain. No vaginal bleeding, no contractions.     Review of History:  Past Medical History:   Diagnosis Date   • Encounter for insertion of mirena IUD    • History of  fainting spells of unknown cause    • Migraine    • Pregnancy, ectopic, tubal 2016     Past Surgical History:   Procedure Laterality Date   • DIAGNOSTIC LAPAROSCOPY Left 11/10/2016    Procedure:  LAPAROSCOPIC LEFT SALPINGECTOMY ;  Surgeon: Marycruz Edwards MD;  Location: [facility] OR;  Service:    • MOUTH SURGERY         Social History     Socioeconomic History   • Marital status:      Spouse name: WONG   • Number of children: 0   Tobacco Use   • Smoking status: Never     Passive exposure: Never   • Smokeless tobacco: Never   Vaping Use   • Vaping status: Never Used   Substance and Sexual Activity   • Alcohol use: No   • Drug use: No   • Sexual activity: Yes     Partners: Male     Birth control/protection: None     Comment: spouse = Wong     History reviewed. No pertinent family history.   Allergies   Allergen Reactions   • Prednisone Other (See Comments)     ANYTHING CONTAINING STEROIDS.  Makes patient angry and self destructive.      Current Outpatient Medications on File Prior to Visit   Medication Sig Dispense Refill   • aspirin 81 MG EC tablet Take 1 tablet by mouth Daily. 90 tablet 2   • pantoprazole (Protonix) 40 MG EC tablet Take 1 tablet by mouth Daily. 30 tablet 2   • Prenatal Vit-Fe Fumarate-FA (prenatal vitamin 27-0.8) 27-0.8 MG tablet tablet Take 1 tablet by mouth Daily.       No current facility-administered medications on file prior to visit.        Past obstetric, gynecological, medical, surgical, family and social history reviewed.  Relevant lab work and imaging reviewed.    Review of systems  Constitutional:  denies fever, chills, malaise.   ENT/Mouth:  denies sore throat, tinnitus  Eyes: denies vision changes/pain  CV:  denies chest pain  Respiratory:  denies cough/SOB  GI:  denies N/V, diarrhea, abdominal pain.    :   denies dysuria  Skin:  denies lesions or pruritus   Neuro:  denies weakness, focal neurologic symptoms    Vitals:    08/14/24 0744   BP: 117/61   BP Location:  Right arm   Patient Position: Sitting   Pulse: 86   Temp: 97.5 °F (36.4 °C)   TempSrc: Temporal   Weight: 84.6 kg (186 lb 9.6 oz)     PHYSICAL EXAM   GENERAL: Not in acute distress, AAOx3, pleasant  CARDIO: regular rate and rhythm  PULM: symmetric chest rise, speaking in complete sentences without difficulty  NEURO: awake, alert and oriented to person, place, and time  ABDOMINAL: No fundal tenderness, no rebound or guarding, gravid  EXTREMITIES: no bilateral lower extremity edema/tenderness  SKIN: Warm, well-perfused      ULTRASOUND   Please view full ultrasound note on Imaging tab in ViewPoint.  Cephalic presentation  Anterior placenta  JOSEPH 11 cm, which is normal.  BPP 8/8  Sub-optimal view of anatomy due to late gestational age.    Abnormal kidneys and bladder-bladder appears enlarged. Bilateral renal pelvis dilation, and calyceal dilation.   Right renal pelvis 11 mm, right ureter 6.6 mm. Left renal pelvis 10.2 mm, left ureter 6.9 mm      ASSESSMENT/COUNSELIN y.o.   38w3d gestation (Estimated Date of Delivery: 24).    -Pregnancy  [ X ] stable  [   ] improving [  ] worsening    Diagnoses and all orders for this visit:    1. Pyelectasis of fetus on prenatal ultrasound (Primary)    2. AMA (advanced maternal age) multigravida 35+, third trimester       FETAL URINARY TRACT DILATION, SEVERE WITH CALYCEAL DILATION/HYDRONEPHROSIS AND ENLARGED BLADDER  Her US today is reassuring overall, severe urinary tract dilation (UTD) was identified.  The patient was counseled that UTD represents dilation of the renal pelvis and may be unilateral or bilateral.  This finding is identified in approximately 2-3% of prenatal ultrasounds and is more common in males than females.  It can be a normal variant or can reflect a structural renal abnormality.  In her setting, this is probably an obstructive process.  The right side is dilated at 11 mm and the right ureter is dilated at 6.6 mm. The left renal pelvis is 10.2 mm  and the left ureter is 6.9 mm. The bladder also appears enlarged.  Dr Vance reviewed these images and feels like it looks stable to better compared to last week. We discussed the association between UTD and aneuploidy (including Trisomy 21), but I reassured her that her risk of this was exceptionally low as she has a low risk cell free DNA.  Thus, no further genetic screening is recommended given this finding alone.  In the absence of other sonographic findings, a positive family history, advanced maternal age or an abnormal serum screen, the likelihood of fetal Trisomy 21 with isolated renal pelvis dilatation is <~1%.       Otherwise prenatally diagnosed cases of severe UTD often require surgery to remove obstruction and have variable degrees of damage to that kidney.  We discussed that sometimes that kidney does not work at all and sometimes it has some function.  Given the ureteral dilation, as well as this severe hydronephrosis, we will send a STAT consult to urology.  We also discussed that she may need to deliver at [place] so that she can get the VCUG shortly after delivery--ultimately this will be up to urology, but I anticipate this will be what is recommended. Our office nurse, KRISTIE De Jesus, did speak to Dr Harshad Jefferson in the NICU who said she could deliver at [facility] and have the VCUG done outpatient if needed.  We discussed that I overall expect her baby to do well as she has normal fluid--indicating that her baby has some normal kidney function.   We discussed that individuals with one kidney need urology follow up and may require monitoring via different imaging modalities, have a higher risk of infection in the urinary tract and may need prophylactic antibotics, and may require surgery, but overall I don't anticipate this to be a life limiting diagnosis unless an unexpected genetic anomaly is diagnosed--I do not suspect this based on US images and normal cell free DNA but can never totally  "rule this out.       All patient's questions were answered.    PEDS UROLOGY CONSULT  Scan on 8/12/2024 1621 by [employer], [place]: RT FETAL HYDRONEPHROSIS, [facility], 08/08/2024   From note by BRAYAN Lorenzo -- \"Due to bilateral renal dilation and distended bladder noted on prenatal ultrasound performed at 37 weeks, I discussed with the patient's mother that she may want to consider delivering at [facility] in order to facilitate a VCUG soon after birth and consultation with the pediatric urologist on-call after the baby is born due to concerns of possible obstructive uropathy. Will discuss with her [facility] doctors and plan for delivery at [facility], if there is persistent bilateral hydronephrosis and bladder distention.\"      Pt states she did speak with Dr Jha last week from [facility] via telehealth and he confirmed the information I had given her and reassured her.   Pt had peds urology consult last Thursday.   Pt reports that the NP from [facility] called her Monday 08/12/24 and discussed with her considering delivery at [facility]. She apparently had just got ultrasound images and was able to review them which was the reason for new recommendation. Pt is not interested in delivering at [facility]. She wants to deliver at [facility]. She is aware that we can not do VCUG here, but that we can get renal ultrasound, send baby home on antibiotics and schedule VCUG outpatient. Pt would like to do this. She is aware that there is a chance baby may need to be transferred and she is ok with this if it is necessary but she would like to deliver at [facility]. Dr Jefferson in NICU is aware that recommendation to deliver at [facility] was made and that patient does not wish to do this. He said that is fine and we can transfer baby if needed. Mom is agreeable to this plan.       Summary of Plan  -S/P Urology Consult  -Continue routine prenatal care with primary OB  -Weekly " fetal  surveillance until delivery (Primary OB)  -Starting at 28 weeks: Fetal movement instructions given continue daily until delivery; instructed to report to labor and delivery if cannot achieve more than 10 kicks in one hour or if she perceives a decrease in fetal movement  - Recommend delivery at 39 weeks    Follow-up:No follow up with MFM scheduled, but I am happy to see for follow up at request of primary obstetrician    Thank you for the consult and opportunity to care for this patient.  Please feel free to reach out with any questions or concerns.      I spent 30 minutes caring for this patient on this date of service. This time includes time spent by me in the following activities: preparing for the visit, reviewing tests, obtaining and/or reviewing a separately obtained history, performing a medically appropriate examination and/or evaluation, counseling and educating the patient/family/caregiver and independently interpreting results and communicating that information with the patient/family/caregiver with greater than 50% spent in counseling and coordination of care.     BRAYAN Vazquez  Maternal Fetal Medicine-[facility]  Office: [phone]  [email or web address]

## 2024-08-14 NOTE — PROGRESS NOTES
Pt reports that she is doing well and denies vaginal bleeding or LOF at this time. Notes irregular lower abdominal/back cramping, denies consistency. Reports active fetal movement. Reviewed when to call OB office or present to L&D for evaluation with symptoms such as decreased fetal movement, vaginal bleeding, LOF or ctxs. Pt verbalized understanding. Denies HA, visual changes or epigastric pain. Denies any additional complaints at time of appointment. Next OB appointment scheduled for 8/19.    Vitals:    08/14/24 0744   BP: 117/61   Pulse: 86   Temp: 97.5 °F (36.4 °C)

## 2024-08-16 ENCOUNTER — HOSPITAL ENCOUNTER (EMERGENCY)
Facility: HOSPITAL | Age: 34
Discharge: HOME | End: 2024-08-16
Attending: OBSTETRICS & GYNECOLOGY | Admitting: OBSTETRICS & GYNECOLOGY
Payer: COMMERCIAL

## 2024-08-16 ENCOUNTER — TELEPHONE (OUTPATIENT)
Dept: OBSTETRICS AND GYNECOLOGY | Age: 34
End: 2024-08-16
Payer: COMMERCIAL

## 2024-08-16 VITALS
SYSTOLIC BLOOD PRESSURE: 101 MMHG | BODY MASS INDEX: 30.92 KG/M2 | DIASTOLIC BLOOD PRESSURE: 75 MMHG | OXYGEN SATURATION: 97 % | WEIGHT: 185.6 LBS | RESPIRATION RATE: 17 BRPM | HEART RATE: 85 BPM | HEIGHT: 65 IN | TEMPERATURE: 98.6 F

## 2024-08-16 LAB
B PARAPERT DNA SPEC QL NAA+PROBE: NOT DETECTED
B PERT DNA SPEC QL NAA+PROBE: NOT DETECTED
BACTERIA UR QL AUTO: ABNORMAL /HPF
BILIRUB UR QL STRIP: NEGATIVE
C PNEUM DNA NPH QL NAA+NON-PROBE: NOT DETECTED
CLARITY UR: ABNORMAL
COLOR UR: YELLOW
FLUAV SUBTYP SPEC NAA+PROBE: NOT DETECTED
FLUBV RNA ISLT QL NAA+PROBE: NOT DETECTED
GLUCOSE UR STRIP-MCNC: NEGATIVE MG/DL
HADV DNA SPEC NAA+PROBE: NOT DETECTED
HCOV 229E RNA SPEC QL NAA+PROBE: NOT DETECTED
HCOV HKU1 RNA SPEC QL NAA+PROBE: NOT DETECTED
HCOV NL63 RNA SPEC QL NAA+PROBE: NOT DETECTED
HCOV OC43 RNA SPEC QL NAA+PROBE: NOT DETECTED
HGB UR QL STRIP.AUTO: ABNORMAL
HMPV RNA NPH QL NAA+NON-PROBE: NOT DETECTED
HPIV1 RNA ISLT QL NAA+PROBE: NOT DETECTED
HPIV2 RNA SPEC QL NAA+PROBE: NOT DETECTED
HPIV3 RNA NPH QL NAA+PROBE: NOT DETECTED
HPIV4 P GENE NPH QL NAA+PROBE: NOT DETECTED
HYALINE CASTS UR QL AUTO: ABNORMAL /LPF
KETONES UR QL STRIP: NEGATIVE
LEUKOCYTE ESTERASE UR QL STRIP.AUTO: ABNORMAL
M PNEUMO IGG SER IA-ACNC: NOT DETECTED
NITRITE UR QL STRIP: NEGATIVE
PH UR STRIP.AUTO: 7 [PH] (ref 5–8)
PROT UR QL STRIP: ABNORMAL
RBC # UR STRIP: ABNORMAL /HPF
REF LAB TEST METHOD: ABNORMAL
RHINOVIRUS RNA SPEC NAA+PROBE: NOT DETECTED
RSV RNA NPH QL NAA+NON-PROBE: NOT DETECTED
SARS-COV-2 RNA NPH QL NAA+NON-PROBE: NOT DETECTED
SP GR UR STRIP: 1.02 (ref 1–1.03)
SQUAMOUS #/AREA URNS HPF: ABNORMAL /HPF
UROBILINOGEN UR QL STRIP: ABNORMAL
WBC # UR STRIP: ABNORMAL /HPF

## 2024-08-16 PROCEDURE — 59025 FETAL NON-STRESS TEST: CPT

## 2024-08-16 PROCEDURE — 81001 URINALYSIS AUTO W/SCOPE: CPT | Performed by: OBSTETRICS & GYNECOLOGY

## 2024-08-16 PROCEDURE — 63710000001 ONDANSETRON ODT 4 MG TABLET DISPERSIBLE: Performed by: OBSTETRICS & GYNECOLOGY

## 2024-08-16 PROCEDURE — 99284 EMERGENCY DEPT VISIT MOD MDM: CPT | Performed by: OBSTETRICS & GYNECOLOGY

## 2024-08-16 PROCEDURE — 0202U NFCT DS 22 TRGT SARS-COV-2: CPT | Performed by: OBSTETRICS & GYNECOLOGY

## 2024-08-16 RX ORDER — ONDANSETRON 4 MG/1
4 TABLET, ORALLY DISINTEGRATING ORAL ONCE
Status: COMPLETED | OUTPATIENT
Start: 2024-08-16 | End: 2024-08-16

## 2024-08-16 RX ORDER — ONDANSETRON 4 MG/1
4 TABLET, ORALLY DISINTEGRATING ORAL EVERY 6 HOURS PRN
Qty: 20 TABLET | Refills: 1 | Status: SHIPPED | OUTPATIENT
Start: 2024-08-16

## 2024-08-16 RX ORDER — THIAMINE HYDROCHLORIDE 100 MG/ML
100 INJECTION, SOLUTION INTRAMUSCULAR; INTRAVENOUS ONCE
Status: DISCONTINUED | OUTPATIENT
Start: 2024-08-16 | End: 2024-08-16

## 2024-08-16 RX ADMIN — ONDANSETRON 4 MG: 4 TABLET, ORALLY DISINTEGRATING ORAL at 15:23

## 2024-08-16 NOTE — OBED NOTES
[facility] Note OB        Patient Name: Daisy Tate  YOB: 1990  MRN: [id number]  Admission Date: 2024  2:03 PM  Date of Service: 2024    Chief Complaint: Nausea ([facility]- at 38+5wks arrives from home with c/o loss of mucous plug on Tuesday and N/V, back pain and cough that started yesterday. PT reports +FM and denies LOF, VB, ctxs. Abdomen palpates soft. ), Cough, and Back Pain        Subjective     Daisy Tate is a 34 y.o. female  at 38w5d with Estimated Date of Delivery: 24 who presents with the chief complaint listed above.  She sees Marycruz Blankenship* for her prenatal care. Her pregnancy has been complicated by:   fetal renal pyelectasis AMA, GERD .    Patient with URI-type symptoms.  She reports onset of nausea, cough, chills, and bilateral lower back pain that started yesterday.  She denies difficulty breathing and overall is able to eat and drink.  She has some nausea but hasn't taken anything for it.  She lost her mucus plug a few days ago but denies leakage of fluid or contractions.  She feels chills but has had a normal temperature with no documented fever.      She describes fetal movement as normal.  She denies rupture of membranes.  She denies vaginal bleeding. She is not feeling contractions.          Objective   Patient Active Problem List    Diagnosis     Pyelectasis of fetus on prenatal ultrasound [O35.EXX0]     AMA (advanced maternal age) multigravida 35+, third trimester [O09.523]     Gastroesophageal reflux disease without esophagitis [K21.9]     Multigravida of advanced maternal age in third trimester [O09.523]     Antepartum multigravida of advanced maternal age [O09.529]     Multigravida of advanced maternal age in second trimester [O09.522]     Anxiety [F41.9]     History of ectopic pregnancy [Z87.59]     Tubal pregnancy without intrauterine pregnancy [O00.109]         OB History    Para Term  AB Living   2 0 0 0 1 0   SAB IAB  Ectopic Molar Multiple Live Births   0 0 1 0 0 0      # Outcome Date GA Lbr Carmelo/2nd Weight Sex Type Anes PTL Lv   2 Current            1 Ectopic 2016                Past Medical History:   Diagnosis Date    Encounter for insertion of mirena IUD 2017    History of fainting spells of unknown cause     Migraine     Pregnancy, ectopic, tubal 2016       Past Surgical History:   Procedure Laterality Date    DIAGNOSTIC LAPAROSCOPY Left 11/10/2016    Procedure:  LAPAROSCOPIC LEFT SALPINGECTOMY ;  Surgeon: Marycruz Edwards MD;  Location: [facility] OR;  Service:     MOUTH SURGERY      WISDOM TOOTH EXTRACTION         No current facility-administered medications on file prior to encounter.     Current Outpatient Medications on File Prior to Encounter   Medication Sig Dispense Refill    aspirin 81 MG EC tablet Take 1 tablet by mouth Daily. 90 tablet 2    pantoprazole (Protonix) 40 MG EC tablet Take 1 tablet by mouth Daily. 30 tablet 2    Prenatal Vit-Fe Fumarate-FA (prenatal vitamin 27-0.8) 27-0.8 MG tablet tablet Take 1 tablet by mouth Daily.         Allergies   Allergen Reactions    Prednisone Other (See Comments)     ANYTHING CONTAINING STEROIDS.  Makes patient angry and self destructive.       History reviewed. No pertinent family history.    Social History     Socioeconomic History    Marital status:      Spouse name: WOODROW    Number of children: 0   Tobacco Use    Smoking status: Never     Passive exposure: Never    Smokeless tobacco: Never   Vaping Use    Vaping status: Never Used   Substance and Sexual Activity    Alcohol use: No    Drug use: No    Sexual activity: Yes     Partners: Male     Birth control/protection: None     Comment: spouse = Woodrow           Review of Systems   Constitutional:  Positive for activity change and chills. Negative for fatigue and fever.   HENT:  Negative for congestion, rhinorrhea and sore throat.    Eyes:  Negative for visual disturbance.   Respiratory:  Positive for cough.   "  Cardiovascular: Negative.    Gastrointestinal:  Negative for abdominal pain, constipation, diarrhea, nausea and vomiting.   Genitourinary:  Positive for vaginal discharge. Negative for difficulty urinating, dyspareunia, dysuria, flank pain, frequency, genital sores, hematuria, pelvic pain, urgency, vaginal bleeding and vaginal pain.   Musculoskeletal:  Positive for back pain.   Neurological:  Negative for dizziness, seizures, light-headedness and headaches.   Psychiatric/Behavioral:  Negative for sleep disturbance. The patient is not nervous/anxious.           PHYSICAL EXAM:      VITAL SIGNS:  Vitals:    08/16/24 1300 08/16/24 1420 08/16/24 1421   BP:   118/64   BP Location:   Right arm   Patient Position:   Sitting   Pulse:  79 82   Resp:   17   Temp:   98.6 °F (37 °C)   TempSrc:   Oral   SpO2:  98%    Weight: 84.2 kg (185 lb 9.6 oz)     Height:   165.1 cm (65\")        FHT'S:                   Baseline:  130 BPM  Variability:  Moderate = 6 - 25 BPM  Accelerations:  15 x 15 accelerations present     Decelerations:  absent  Contractions:   absent     Interpretation:    Reactive NST, CAT 1 tracing        PHYSICAL EXAM:    General: well developed; well nourished  no acute distress   Heart: Not performed.   Lungs  : breathing is unlabored satting 99% on RA     Abdomen: soft, non-tender; no masses  no umbilical or inguinal hernias are present  no hepato-splenomegaly       Cervix: was not checked.      Contractions: none        Extremities: peripheral pulses normal, no pedal edema, no clubbing or cyanosis      LABS AND TESTING ORDERED:  Uterine and fetal monitoring  Urinalysis  Respiratory virus panel    LAB RESULTS:    No results found for this or any previous visit (from the past 24 hour(s)).    Lab Results   Component Value Date    ABO O 01/08/2024    RH Positive 01/08/2024       Lab Results   Component Value Date    STREPGPB Negative 07/29/2024                 Assessment & Plan     ASSESSMENT/PLAN:  Daisy Tate " "is a 34 y.o. female  at 38w5d who presented with: URI symptoms.  Patient given zofran and a respiratory virus panel was sent.  RVP negative.  Patient counseled on supportive care and reasons to return to triage.  She was discharged home.          Final Impression:  Pregnancy at 38w5d  Reactive NST.  CAT 1 tracing  Maternal vital signs were reviewed and were unremarkable              Vitals:    24 1300 24 1420 24 1421   BP:   118/64   BP Location:   Right arm   Patient Position:   Sitting   Pulse:  79 82   Resp:   17   Temp:   98.6 °F (37 °C)   TempSrc:   Oral   SpO2:  98%    Weight: 84.2 kg (185 lb 9.6 oz)     Height:   165.1 cm (65\")       Lab Results   Component Value Date    STREPGPB Negative 2024     Lab Results   Component Value Date    ABO O 2024    RH Positive 2024     COVID - 19 status unknown      PLAN:       I have spent 45 minutes including face to face time with the patient, greater than 50% in discussion of the diagnosis (counseling) and/or coordination of care.     Penny Reed MD  2024  14:36 EDT  OB Hospitalist  Phone:  [phone]  "

## 2024-08-16 NOTE — TELEPHONE ENCOUNTER
Ms. Tate is calling this morning stating she lost her mucous plus Tues night. Today she is feeling nauseous and is experiencing temperature changes. One minute your feeling warm and sweating though her clothes, then the next she feels cold(freezing). No temp associated with these symptoms. Please advise    CALL BACK # [phone]

## 2024-08-19 ENCOUNTER — HOSPITAL ENCOUNTER (INPATIENT)
Facility: HOSPITAL | Age: 34
LOS: 3 days | Discharge: HOME | End: 2024-08-22
Attending: OBSTETRICS & GYNECOLOGY | Admitting: STUDENT IN AN ORGANIZED HEALTH CARE EDUCATION/TRAINING PROGRAM
Payer: COMMERCIAL

## 2024-08-19 ENCOUNTER — ANESTHESIA EVENT (OUTPATIENT)
Dept: LABOR AND DELIVERY | Facility: HOSPITAL | Age: 34
End: 2024-08-19
Payer: COMMERCIAL

## 2024-08-19 ENCOUNTER — ANESTHESIA (OUTPATIENT)
Dept: LABOR AND DELIVERY | Facility: HOSPITAL | Age: 34
End: 2024-08-19
Payer: COMMERCIAL

## 2024-08-19 ENCOUNTER — ROUTINE PRENATAL (OUTPATIENT)
Dept: OBSTETRICS AND GYNECOLOGY | Age: 34
End: 2024-08-19
Payer: COMMERCIAL

## 2024-08-19 VITALS — WEIGHT: 183.6 LBS | DIASTOLIC BLOOD PRESSURE: 64 MMHG | BODY MASS INDEX: 30.55 KG/M2 | SYSTOLIC BLOOD PRESSURE: 118 MMHG

## 2024-08-19 DIAGNOSIS — O35.EXX0 PYELECTASIS OF FETUS ON PRENATAL ULTRASOUND: ICD-10-CM

## 2024-08-19 DIAGNOSIS — Z3A.39 39 WEEKS GESTATION OF PREGNANCY: Primary | ICD-10-CM

## 2024-08-19 DIAGNOSIS — Z13.89 SCREENING FOR HEMATURIA OR PROTEINURIA: ICD-10-CM

## 2024-08-19 DIAGNOSIS — O09.523 AMA (ADVANCED MATERNAL AGE) MULTIGRAVIDA 35+, THIRD TRIMESTER: ICD-10-CM

## 2024-08-19 PROBLEM — Z34.90 PREGNANCY: Status: ACTIVE | Noted: 2024-08-19

## 2024-08-19 PROBLEM — O09.513 AMA (ADVANCED MATERNAL AGE) PRIMIGRAVIDA 35+, THIRD TRIMESTER: Status: ACTIVE | Noted: 2024-07-01

## 2024-08-19 PROBLEM — O28.9 ABNORMAL ANTENATAL TEST: Status: ACTIVE | Noted: 2024-08-19

## 2024-08-19 PROBLEM — O42.90 PROLONGED SPONTANEOUS RUPTURE OF MEMBRANES: Status: ACTIVE | Noted: 2024-08-19

## 2024-08-19 PROBLEM — O09.522 MULTIGRAVIDA OF ADVANCED MATERNAL AGE IN SECOND TRIMESTER: Status: RESOLVED | Noted: 2024-04-02 | Resolved: 2024-08-19

## 2024-08-19 PROBLEM — O28.9 ABNORMAL ANTENATAL TEST: Status: RESOLVED | Noted: 2024-08-19 | Resolved: 2024-08-19

## 2024-08-19 PROBLEM — O09.529 ANTEPARTUM MULTIGRAVIDA OF ADVANCED MATERNAL AGE: Status: RESOLVED | Noted: 2024-05-30 | Resolved: 2024-08-19

## 2024-08-19 PROBLEM — Z34.90 PREGNANCY: Status: RESOLVED | Noted: 2024-08-19 | Resolved: 2024-08-19

## 2024-08-19 PROBLEM — O28.3 ABNORMAL FETAL ULTRASOUND: Status: ACTIVE | Noted: 2024-08-19

## 2024-08-19 LAB
A1 MICROGLOB PLACENTAL VAG QL: POSITIVE
ABO GROUP BLD: NORMAL
ALBUMIN SERPL-MCNC: 3.5 G/DL (ref 3.5–5.2)
ALBUMIN/GLOB SERPL: 1.1 G/DL
ALP SERPL-CCNC: 156 U/L (ref 39–117)
ALT SERPL W P-5'-P-CCNC: 11 U/L (ref 1–33)
ANION GAP SERPL CALCULATED.3IONS-SCNC: 11 MMOL/L (ref 5–15)
AST SERPL-CCNC: 18 U/L (ref 1–32)
ATMOSPHERIC PRESS: 750.9 MMHG
BASE EXCESS BLDCOV CALC-SCNC: -3.8 MMOL/L (ref -30–30)
BASOPHILS # BLD AUTO: 0.03 10*3/MM3 (ref 0–0.2)
BASOPHILS NFR BLD AUTO: 0.2 % (ref 0–1.5)
BDY SITE: ABNORMAL
BILIRUB SERPL-MCNC: 0.4 MG/DL (ref 0–1.2)
BLD GP AB SCN SERPL QL: NEGATIVE
BUN SERPL-MCNC: 7 MG/DL (ref 6–20)
BUN/CREAT SERPL: 9.9 (ref 7–25)
CALCIUM SPEC-SCNC: 9.2 MG/DL (ref 8.6–10.5)
CHLORIDE SERPL-SCNC: 102 MMOL/L (ref 98–107)
CLARITY, POC: CLEAR
CO2 BLDA-SCNC: 21.7 MMOL/L (ref 23–27)
CO2 SERPL-SCNC: 19 MMOL/L (ref 22–29)
COLOR UR: NORMAL
CREAT SERPL-MCNC: 0.71 MG/DL (ref 0.57–1)
DEPRECATED RDW RBC AUTO: 37.7 FL (ref 37–54)
DEVICE COMMENT: ABNORMAL
EGFRCR SERPLBLD CKD-EPI 2021: 113.2 ML/MIN/1.73
EOSINOPHIL # BLD AUTO: 0.03 10*3/MM3 (ref 0–0.4)
EOSINOPHIL NFR BLD AUTO: 0.2 % (ref 0.3–6.2)
ERYTHROCYTE [DISTWIDTH] IN BLOOD BY AUTOMATED COUNT: 12.3 % (ref 12.3–15.4)
GLOBULIN UR ELPH-MCNC: 3.1 GM/DL
GLUCOSE SERPL-MCNC: 106 MG/DL (ref 65–99)
GLUCOSE UR STRIP-MCNC: NEGATIVE MG/DL
HCO3 BLDCOV-SCNC: 20.6 MMOL/L
HCT VFR BLD AUTO: 36.5 % (ref 34–46.6)
HGB BLD-MCNC: 12.2 G/DL (ref 12–15.9)
IMM GRANULOCYTES # BLD AUTO: 0.13 10*3/MM3 (ref 0–0.05)
IMM GRANULOCYTES NFR BLD AUTO: 0.8 % (ref 0–0.5)
LYMPHOCYTES # BLD AUTO: 1.06 10*3/MM3 (ref 0.7–3.1)
LYMPHOCYTES NFR BLD AUTO: 6.3 % (ref 19.6–45.3)
MCH RBC QN AUTO: 28.5 PG (ref 26.6–33)
MCHC RBC AUTO-ENTMCNC: 33.4 G/DL (ref 31.5–35.7)
MCV RBC AUTO: 85.3 FL (ref 79–97)
MODALITY: ABNORMAL
MONOCYTES # BLD AUTO: 1.2 10*3/MM3 (ref 0.1–0.9)
MONOCYTES NFR BLD AUTO: 7.1 % (ref 5–12)
NEUTROPHILS NFR BLD AUTO: 14.46 10*3/MM3 (ref 1.7–7)
NEUTROPHILS NFR BLD AUTO: 85.4 % (ref 42.7–76)
NRBC BLD AUTO-RTO: 0 /100 WBC (ref 0–0.2)
PCO2 BLDCOV: 35.5 MM HG (ref 35–51.3)
PH BLDCOV: 7.37 PH UNITS (ref 7.26–7.4)
PLATELET # BLD AUTO: 192 10*3/MM3 (ref 140–450)
PMV BLD AUTO: 9.9 FL (ref 6–12)
PO2 BLDCOV: <22.1 MM HG (ref 19–39)
POTASSIUM SERPL-SCNC: 3.9 MMOL/L (ref 3.5–5.2)
PROT SERPL-MCNC: 6.6 G/DL (ref 6–8.5)
PROT UR STRIP-MCNC: NEGATIVE MG/DL
RBC # BLD AUTO: 4.28 10*6/MM3 (ref 3.77–5.28)
RH BLD: POSITIVE
SAO2 % BLDCOV: ABNORMAL %
SODIUM SERPL-SCNC: 132 MMOL/L (ref 136–145)
T&S EXPIRATION DATE: NORMAL
TREPONEMA PALLIDUM IGG+IGM AB [PRESENCE] IN SERUM OR PLASMA BY IMMUNOASSAY: NORMAL
WBC NRBC COR # BLD AUTO: 16.91 10*3/MM3 (ref 3.4–10.8)

## 2024-08-19 PROCEDURE — 82803 BLOOD GASES ANY COMBINATION: CPT | Performed by: STUDENT IN AN ORGANIZED HEALTH CARE EDUCATION/TRAINING PROGRAM

## 2024-08-19 PROCEDURE — 25010000002 ROPIVACAINE PER 1 MG: Performed by: STUDENT IN AN ORGANIZED HEALTH CARE EDUCATION/TRAINING PROGRAM

## 2024-08-19 PROCEDURE — 25010000002 ONDANSETRON PER 1 MG: Performed by: STUDENT IN AN ORGANIZED HEALTH CARE EDUCATION/TRAINING PROGRAM

## 2024-08-19 PROCEDURE — 86850 RBC ANTIBODY SCREEN: CPT | Performed by: STUDENT IN AN ORGANIZED HEALTH CARE EDUCATION/TRAINING PROGRAM

## 2024-08-19 PROCEDURE — 25810000003 LACTATED RINGERS PER 1000 ML: Performed by: STUDENT IN AN ORGANIZED HEALTH CARE EDUCATION/TRAINING PROGRAM

## 2024-08-19 PROCEDURE — 3E0E3GC INTRODUCTION OF OTHER THERAPEUTIC SUBSTANCE INTO PRODUCTS OF CONCEPTION, PERCUTANEOUS APPROACH: ICD-10-PCS | Performed by: STUDENT IN AN ORGANIZED HEALTH CARE EDUCATION/TRAINING PROGRAM

## 2024-08-19 PROCEDURE — 86780 TREPONEMA PALLIDUM: CPT | Performed by: STUDENT IN AN ORGANIZED HEALTH CARE EDUCATION/TRAINING PROGRAM

## 2024-08-19 PROCEDURE — 86900 BLOOD TYPING SEROLOGIC ABO: CPT | Performed by: STUDENT IN AN ORGANIZED HEALTH CARE EDUCATION/TRAINING PROGRAM

## 2024-08-19 PROCEDURE — 86901 BLOOD TYPING SEROLOGIC RH(D): CPT | Performed by: STUDENT IN AN ORGANIZED HEALTH CARE EDUCATION/TRAINING PROGRAM

## 2024-08-19 PROCEDURE — 25010000002 CLONIDINE PER 1 MG: Performed by: STUDENT IN AN ORGANIZED HEALTH CARE EDUCATION/TRAINING PROGRAM

## 2024-08-19 PROCEDURE — 84112 EVAL AMNIOTIC FLUID PROTEIN: CPT | Performed by: STUDENT IN AN ORGANIZED HEALTH CARE EDUCATION/TRAINING PROGRAM

## 2024-08-19 PROCEDURE — 80053 COMPREHEN METABOLIC PANEL: CPT | Performed by: STUDENT IN AN ORGANIZED HEALTH CARE EDUCATION/TRAINING PROGRAM

## 2024-08-19 PROCEDURE — 25010000002 MORPHINE PER 10 MG: Performed by: NURSE ANESTHETIST, CERTIFIED REGISTERED

## 2024-08-19 PROCEDURE — 25010000002 CEFAZOLIN PER 500 MG: Performed by: STUDENT IN AN ORGANIZED HEALTH CARE EDUCATION/TRAINING PROGRAM

## 2024-08-19 PROCEDURE — 0502F SUBSEQUENT PRENATAL CARE: CPT | Performed by: OBSTETRICS & GYNECOLOGY

## 2024-08-19 PROCEDURE — 25010000002 PHENYLEPHRINE 10 MG/ML SOLUTION: Performed by: ANESTHESIOLOGY

## 2024-08-19 PROCEDURE — 85025 COMPLETE CBC W/AUTO DIFF WBC: CPT | Performed by: STUDENT IN AN ORGANIZED HEALTH CARE EDUCATION/TRAINING PROGRAM

## 2024-08-19 PROCEDURE — 25010000002 EPINEPHRINE 1 MG/ML SOLUTION 30 ML VIAL: Performed by: STUDENT IN AN ORGANIZED HEALTH CARE EDUCATION/TRAINING PROGRAM

## 2024-08-19 PROCEDURE — 25810000003 SODIUM CHLORIDE 0.9 % SOLUTION 250 ML FLEX CONT: Performed by: STUDENT IN AN ORGANIZED HEALTH CARE EDUCATION/TRAINING PROGRAM

## 2024-08-19 PROCEDURE — C1755 CATHETER, INTRASPINAL: HCPCS | Performed by: ANESTHESIOLOGY

## 2024-08-19 PROCEDURE — 25010000002 AZITHROMYCIN PER 500 MG: Performed by: STUDENT IN AN ORGANIZED HEALTH CARE EDUCATION/TRAINING PROGRAM

## 2024-08-19 DEVICE — DEV CONTRL TISS STRATAFIXSPIRALMNCRYL PLSPS2 REV4/0 30CM: Type: IMPLANTABLE DEVICE | Site: ABDOMEN | Status: FUNCTIONAL

## 2024-08-19 RX ORDER — DIPHENHYDRAMINE HYDROCHLORIDE 50 MG/ML
25 INJECTION INTRAMUSCULAR; INTRAVENOUS EVERY 4 HOURS PRN
Status: CANCELLED | OUTPATIENT
Start: 2024-08-19

## 2024-08-19 RX ORDER — MAGNESIUM CARB/ALUMINUM HYDROX 105-160MG
30 TABLET,CHEWABLE ORAL ONCE AS NEEDED
Status: DISCONTINUED | OUTPATIENT
Start: 2024-08-19 | End: 2024-08-20 | Stop reason: HOSPADM

## 2024-08-19 RX ORDER — ONDANSETRON 4 MG/1
4 TABLET, ORALLY DISINTEGRATING ORAL EVERY 6 HOURS PRN
Status: DISCONTINUED | OUTPATIENT
Start: 2024-08-19 | End: 2024-08-20 | Stop reason: HOSPADM

## 2024-08-19 RX ORDER — ACETAMINOPHEN 325 MG/1
650 TABLET ORAL EVERY 4 HOURS PRN
Status: DISCONTINUED | OUTPATIENT
Start: 2024-08-19 | End: 2024-08-20 | Stop reason: HOSPADM

## 2024-08-19 RX ORDER — DIPHENHYDRAMINE HCL 25 MG
25 CAPSULE ORAL EVERY 4 HOURS PRN
Status: CANCELLED | OUTPATIENT
Start: 2024-08-19

## 2024-08-19 RX ORDER — FENTANYL/ROPIVACAINE/NS/PF 2MCG/ML-.2
10 PLASTIC BAG, INJECTION (ML) INJECTION CONTINUOUS
Status: DISCONTINUED | OUTPATIENT
Start: 2024-08-19 | End: 2024-08-20

## 2024-08-19 RX ORDER — FAMOTIDINE 20 MG/1
20 TABLET, FILM COATED ORAL 2 TIMES DAILY PRN
Status: DISCONTINUED | OUTPATIENT
Start: 2024-08-19 | End: 2024-08-20 | Stop reason: HOSPADM

## 2024-08-19 RX ORDER — HYDROMORPHONE HYDROCHLORIDE 1 MG/ML
0.5 INJECTION, SOLUTION INTRAMUSCULAR; INTRAVENOUS; SUBCUTANEOUS
Status: CANCELLED | OUTPATIENT
Start: 2024-08-19 | End: 2024-08-20

## 2024-08-19 RX ORDER — FAMOTIDINE 10 MG/ML
20 INJECTION, SOLUTION INTRAVENOUS ONCE AS NEEDED
Status: COMPLETED | OUTPATIENT
Start: 2024-08-19 | End: 2024-08-19

## 2024-08-19 RX ORDER — MORPHINE SULFATE 1 MG/ML
INJECTION, SOLUTION EPIDURAL; INTRATHECAL; INTRAVENOUS AS NEEDED
Status: DISCONTINUED | OUTPATIENT
Start: 2024-08-19 | End: 2024-08-20 | Stop reason: SURG

## 2024-08-19 RX ORDER — OXYTOCIN/0.9 % SODIUM CHLORIDE 30/500 ML
250 PLASTIC BAG, INJECTION (ML) INTRAVENOUS CONTINUOUS
Status: DISCONTINUED | OUTPATIENT
Start: 2024-08-19 | End: 2024-08-20

## 2024-08-19 RX ORDER — PHYTONADIONE 1 MG/.5ML
INJECTION, EMULSION INTRAMUSCULAR; INTRAVENOUS; SUBCUTANEOUS
Status: ACTIVE
Start: 2024-08-19 | End: 2024-08-20

## 2024-08-19 RX ORDER — ONDANSETRON 2 MG/ML
4 INJECTION INTRAMUSCULAR; INTRAVENOUS ONCE AS NEEDED
Status: COMPLETED | OUTPATIENT
Start: 2024-08-19 | End: 2024-08-19

## 2024-08-19 RX ORDER — ONDANSETRON 2 MG/ML
4 INJECTION INTRAMUSCULAR; INTRAVENOUS EVERY 6 HOURS PRN
Status: DISCONTINUED | OUTPATIENT
Start: 2024-08-19 | End: 2024-08-20 | Stop reason: HOSPADM

## 2024-08-19 RX ORDER — FAMOTIDINE 10 MG/ML
20 INJECTION, SOLUTION INTRAVENOUS ONCE AS NEEDED
Status: DISCONTINUED | OUTPATIENT
Start: 2024-08-19 | End: 2024-08-20 | Stop reason: HOSPADM

## 2024-08-19 RX ORDER — FAMOTIDINE 10 MG/ML
20 INJECTION, SOLUTION INTRAVENOUS 2 TIMES DAILY PRN
Status: DISCONTINUED | OUTPATIENT
Start: 2024-08-19 | End: 2024-08-20 | Stop reason: HOSPADM

## 2024-08-19 RX ORDER — MORPHINE SULFATE 2 MG/ML
2 INJECTION, SOLUTION INTRAMUSCULAR; INTRAVENOUS
Status: CANCELLED | OUTPATIENT
Start: 2024-08-19 | End: 2024-08-20

## 2024-08-19 RX ORDER — SODIUM CHLORIDE 0.9 % (FLUSH) 0.9 %
10 SYRINGE (ML) INJECTION AS NEEDED
Status: DISCONTINUED | OUTPATIENT
Start: 2024-08-19 | End: 2024-08-20 | Stop reason: HOSPADM

## 2024-08-19 RX ORDER — TRANEXAMIC ACID 10 MG/ML
1000 INJECTION, SOLUTION INTRAVENOUS ONCE AS NEEDED
OUTPATIENT
Start: 2024-08-19

## 2024-08-19 RX ORDER — SODIUM CHLORIDE, SODIUM LACTATE, POTASSIUM CHLORIDE, CALCIUM CHLORIDE 600; 310; 30; 20 MG/100ML; MG/100ML; MG/100ML; MG/100ML
125 INJECTION, SOLUTION INTRAVENOUS CONTINUOUS
Status: DISCONTINUED | OUTPATIENT
Start: 2024-08-19 | End: 2024-08-20

## 2024-08-19 RX ORDER — PHENYLEPHRINE HYDROCHLORIDE 10 MG/ML
INJECTION INTRAVENOUS AS NEEDED
Status: DISCONTINUED | OUTPATIENT
Start: 2024-08-19 | End: 2024-08-20 | Stop reason: SURG

## 2024-08-19 RX ORDER — ONDANSETRON 2 MG/ML
4 INJECTION INTRAMUSCULAR; INTRAVENOUS ONCE AS NEEDED
Status: CANCELLED | OUTPATIENT
Start: 2024-08-19

## 2024-08-19 RX ORDER — KETOROLAC TROMETHAMINE 30 MG/ML
30 INJECTION, SOLUTION INTRAMUSCULAR; INTRAVENOUS ONCE
Status: COMPLETED | OUTPATIENT
Start: 2024-08-19 | End: 2024-08-20

## 2024-08-19 RX ORDER — ROPIVACAINE HYDROCHLORIDE 2 MG/ML
INJECTION, SOLUTION EPIDURAL; INFILTRATION; PERINEURAL AS NEEDED
Status: DISCONTINUED | OUTPATIENT
Start: 2024-08-19 | End: 2024-08-20 | Stop reason: SURG

## 2024-08-19 RX ORDER — OXYTOCIN/0.9 % SODIUM CHLORIDE 30/500 ML
2-20 PLASTIC BAG, INJECTION (ML) INTRAVENOUS
Status: DISCONTINUED | OUTPATIENT
Start: 2024-08-19 | End: 2024-08-20 | Stop reason: HOSPADM

## 2024-08-19 RX ORDER — DIPHENHYDRAMINE HYDROCHLORIDE 50 MG/ML
12.5 INJECTION INTRAMUSCULAR; INTRAVENOUS EVERY 8 HOURS PRN
Status: DISCONTINUED | OUTPATIENT
Start: 2024-08-19 | End: 2024-08-20 | Stop reason: HOSPADM

## 2024-08-19 RX ORDER — ACETAMINOPHEN 500 MG
1000 TABLET ORAL ONCE
Status: COMPLETED | OUTPATIENT
Start: 2024-08-19 | End: 2024-08-19

## 2024-08-19 RX ORDER — DROPERIDOL 2.5 MG/ML
0.62 INJECTION, SOLUTION INTRAMUSCULAR; INTRAVENOUS
Status: CANCELLED | OUTPATIENT
Start: 2024-08-19

## 2024-08-19 RX ORDER — TERBUTALINE SULFATE 1 MG/ML
0.25 INJECTION, SOLUTION SUBCUTANEOUS AS NEEDED
Status: DISCONTINUED | OUTPATIENT
Start: 2024-08-19 | End: 2024-08-20 | Stop reason: HOSPADM

## 2024-08-19 RX ORDER — LIDOCAINE HCL/EPINEPHRINE/PF 2%-1:200K
VIAL (ML) INJECTION AS NEEDED
Status: DISCONTINUED | OUTPATIENT
Start: 2024-08-19 | End: 2024-08-20 | Stop reason: SURG

## 2024-08-19 RX ORDER — EPHEDRINE SULFATE 50 MG/ML
5 INJECTION, SOLUTION INTRAVENOUS
Status: DISCONTINUED | OUTPATIENT
Start: 2024-08-19 | End: 2024-08-20 | Stop reason: HOSPADM

## 2024-08-19 RX ORDER — OXYTOCIN/0.9 % SODIUM CHLORIDE 30/500 ML
999 PLASTIC BAG, INJECTION (ML) INTRAVENOUS ONCE
Status: DISCONTINUED | OUTPATIENT
Start: 2024-08-19 | End: 2024-08-20

## 2024-08-19 RX ORDER — SODIUM CHLORIDE, SODIUM LACTATE, POTASSIUM CHLORIDE, CALCIUM CHLORIDE 600; 310; 30; 20 MG/100ML; MG/100ML; MG/100ML; MG/100ML
300 INJECTION, SOLUTION INTRAVENOUS ONCE AS NEEDED
Status: COMPLETED | OUTPATIENT
Start: 2024-08-19 | End: 2024-08-19

## 2024-08-19 RX ORDER — NALOXONE HCL 0.4 MG/ML
0.2 VIAL (ML) INJECTION
Status: CANCELLED | OUTPATIENT
Start: 2024-08-19

## 2024-08-19 RX ORDER — ERYTHROMYCIN 5 MG/G
OINTMENT OPHTHALMIC
Status: ACTIVE
Start: 2024-08-19 | End: 2024-08-20

## 2024-08-19 RX ADMIN — PHENYLEPHRINE HYDROCHLORIDE 100 MCG: 10 INJECTION INTRAVENOUS at 23:24

## 2024-08-19 RX ADMIN — Medication 999 ML/HR: at 23:20

## 2024-08-19 RX ADMIN — SODIUM CHLORIDE, POTASSIUM CHLORIDE, SODIUM LACTATE AND CALCIUM CHLORIDE 125 ML/HR: 600; 310; 30; 20 INJECTION, SOLUTION INTRAVENOUS at 16:17

## 2024-08-19 RX ADMIN — PHENYLEPHRINE HYDROCHLORIDE 100 MCG: 10 INJECTION INTRAVENOUS at 23:17

## 2024-08-19 RX ADMIN — ROPIVACAINE HYDROCHLORIDE 6 ML: 2 INJECTION, SOLUTION EPIDURAL; INFILTRATION at 18:14

## 2024-08-19 RX ADMIN — SODIUM CHLORIDE, POTASSIUM CHLORIDE, SODIUM LACTATE AND CALCIUM CHLORIDE 125 ML/HR: 600; 310; 30; 20 INJECTION, SOLUTION INTRAVENOUS at 21:15

## 2024-08-19 RX ADMIN — MORPHINE SULFATE 3 MG: 1 INJECTION, SOLUTION EPIDURAL; INTRATHECAL; INTRAVENOUS at 23:28

## 2024-08-19 RX ADMIN — ONDANSETRON 4 MG: 2 INJECTION INTRAMUSCULAR; INTRAVENOUS at 22:38

## 2024-08-19 RX ADMIN — EPHEDRINE SULFATE 10 MG: 50 INJECTION INTRAVENOUS at 23:47

## 2024-08-19 RX ADMIN — PHENYLEPHRINE HYDROCHLORIDE 100 MCG: 10 INJECTION INTRAVENOUS at 23:11

## 2024-08-19 RX ADMIN — LIDOCAINE HYDROCHLORIDE AND EPINEPHRINE 2 ML: 20; 5 INJECTION, SOLUTION EPIDURAL; INFILTRATION; INTRACAUDAL; PERINEURAL at 22:55

## 2024-08-19 RX ADMIN — LIDOCAINE HYDROCHLORIDE AND EPINEPHRINE 4 ML: 20; 5 INJECTION, SOLUTION EPIDURAL; INFILTRATION; INTRACAUDAL; PERINEURAL at 22:44

## 2024-08-19 RX ADMIN — Medication 10 ML/HR: at 18:14

## 2024-08-19 RX ADMIN — PHENYLEPHRINE HYDROCHLORIDE 100 MCG: 10 INJECTION INTRAVENOUS at 23:41

## 2024-08-19 RX ADMIN — CLONIDINE HYDROCHLORIDE 100 ML: 0.1 INJECTION, SOLUTION EPIDURAL at 23:38

## 2024-08-19 RX ADMIN — PHENYLEPHRINE HYDROCHLORIDE 150 MCG: 10 INJECTION INTRAVENOUS at 22:49

## 2024-08-19 RX ADMIN — PHENYLEPHRINE HYDROCHLORIDE 100 MCG: 10 INJECTION INTRAVENOUS at 23:09

## 2024-08-19 RX ADMIN — PHENYLEPHRINE HYDROCHLORIDE 100 MCG: 10 INJECTION INTRAVENOUS at 23:22

## 2024-08-19 RX ADMIN — PHENYLEPHRINE HYDROCHLORIDE 200 MCG: 10 INJECTION INTRAVENOUS at 22:57

## 2024-08-19 RX ADMIN — PHENYLEPHRINE HYDROCHLORIDE 100 MCG: 10 INJECTION INTRAVENOUS at 23:19

## 2024-08-19 RX ADMIN — PHENYLEPHRINE HYDROCHLORIDE 100 MCG: 10 INJECTION INTRAVENOUS at 23:49

## 2024-08-19 RX ADMIN — ACETAMINOPHEN 1000 MG: 500 TABLET ORAL at 22:34

## 2024-08-19 RX ADMIN — PHENYLEPHRINE HYDROCHLORIDE 150 MCG: 10 INJECTION INTRAVENOUS at 22:52

## 2024-08-19 RX ADMIN — FAMOTIDINE 20 MG: 10 INJECTION INTRAVENOUS at 22:36

## 2024-08-19 RX ADMIN — PHENYLEPHRINE HYDROCHLORIDE 100 MCG: 10 INJECTION INTRAVENOUS at 23:33

## 2024-08-19 RX ADMIN — PHENYLEPHRINE HYDROCHLORIDE 100 MCG: 10 INJECTION INTRAVENOUS at 23:04

## 2024-08-19 RX ADMIN — LIDOCAINE HYDROCHLORIDE AND EPINEPHRINE 5 ML: 20; 5 INJECTION, SOLUTION EPIDURAL; INFILTRATION; INTRACAUDAL; PERINEURAL at 23:17

## 2024-08-19 RX ADMIN — EPHEDRINE SULFATE 5 MG: 50 INJECTION INTRAVENOUS at 19:40

## 2024-08-19 RX ADMIN — PHENYLEPHRINE HYDROCHLORIDE 100 MCG: 10 INJECTION INTRAVENOUS at 23:14

## 2024-08-19 RX ADMIN — Medication 2 MILLI-UNITS/MIN: at 19:04

## 2024-08-19 RX ADMIN — PHENYLEPHRINE HYDROCHLORIDE 100 MCG: 10 INJECTION INTRAVENOUS at 23:30

## 2024-08-19 RX ADMIN — EPHEDRINE SULFATE 10 MG: 50 INJECTION INTRAVENOUS at 23:29

## 2024-08-19 RX ADMIN — PHENYLEPHRINE HYDROCHLORIDE 100 MCG: 10 INJECTION INTRAVENOUS at 23:26

## 2024-08-19 RX ADMIN — LIDOCAINE HYDROCHLORIDE AND EPINEPHRINE 4 ML: 20; 5 INJECTION, SOLUTION EPIDURAL; INFILTRATION; INTRACAUDAL; PERINEURAL at 22:47

## 2024-08-19 RX ADMIN — SODIUM CHLORIDE, POTASSIUM CHLORIDE, SODIUM LACTATE AND CALCIUM CHLORIDE 125 ML/HR: 600; 310; 30; 20 INJECTION, SOLUTION INTRAVENOUS at 17:00

## 2024-08-19 RX ADMIN — SODIUM CHLORIDE, POTASSIUM CHLORIDE, SODIUM LACTATE AND CALCIUM CHLORIDE: 600; 310; 30; 20 INJECTION, SOLUTION INTRAVENOUS at 23:16

## 2024-08-19 RX ADMIN — LIDOCAINE HYDROCHLORIDE AND EPINEPHRINE 5 ML: 20; 5 INJECTION, SOLUTION EPIDURAL; INFILTRATION; INTRACAUDAL; PERINEURAL at 23:13

## 2024-08-19 RX ADMIN — AZITHROMYCIN MONOHYDRATE 500 MG: 500 INJECTION, POWDER, LYOPHILIZED, FOR SOLUTION INTRAVENOUS at 23:08

## 2024-08-19 RX ADMIN — SODIUM CHLORIDE 2 G: 900 INJECTION INTRAVENOUS at 22:57

## 2024-08-19 RX ADMIN — SODIUM CHLORIDE, POTASSIUM CHLORIDE, SODIUM LACTATE AND CALCIUM CHLORIDE 300 ML: 600; 310; 30; 20 INJECTION, SOLUTION INTRAVENOUS at 21:50

## 2024-08-19 NOTE — PROGRESS NOTES
Patient has had some contractions also a very little trickle of fluid as well as some decreased fetal movement over the past 12 to 24 hours BPP was done today and she got a score of 4/8  No fetal movement or breathing was seen  Vertex presentation  JOSEPH was 13.8  The renal pelvis measurements continue to be out of range as it has been  Patient cervix is 70/4/-2  She has had an active contraction while in the room  She also had a little bit of fluid leak out after the exam  I advised patient to go to labor and delivery and alerted the labor and delivery suite as well as Dr. Urbina who is on-call    Chief Complaint   Patient presents with    Routine Prenatal Visit     39w1d, BPP today, c/o back pain and cramping       HPI:  Pt presents for routine prenatal visit    ROS:  No fever or chills, no nausea or vomiting, no contractions, no leg pain, no LE edema, no leaking fluid, no bleeding, no headache, no dysuria  All other systems reviewed and negative    Exam:  See flow sheet  General:  Alert and oriented and no distress  Neck: no lymphadenopathy or thyromegaly  Lungs: non - labored breathing  Abd:  See flow sheet, fundus nontender  Ext: see flow sheet, non-tender bilateral , no lesions  Neuro: grossly normal    Assessment:/ PLAN:    Diagnoses and all orders for this visit:    1. 39 weeks gestation of pregnancy (Primary)  -     POC Urinalysis Dipstick    2. Screening for hematuria or proteinuria  -     POC Urinalysis Dipstick    3. Pyelectasis of fetus on prenatal ultrasound    4. AMA (advanced maternal age) multigravida 35+, third trimester

## 2024-08-19 NOTE — ANESTHESIA PREPROCEDURE EVALUATION
Anesthesia Evaluation     Patient summary reviewed and Nursing notes reviewed   no history of anesthetic complications:                Airway   Mallampati: II  TM distance: >3 FB  Neck ROM: full  Dental      Pulmonary    Cardiovascular         Neuro/Psych  GI/Hepatic/Renal/Endo    (+) GERD    Musculoskeletal     Abdominal    Substance History      OB/GYN    (+) Pregnant        Other                    Anesthesia Plan    ASA 2     epidural     (39w1d)    Anesthetic plan, risks, benefits, and alternatives have been provided, discussed and informed consent has been obtained with: patient.    CODE STATUS:    Level Of Support Discussed With: Patient  Code Status (Patient has no pulse and is not breathing): CPR (Attempt to Resuscitate)  Medical Interventions (Patient has pulse or is breathing): Full Support

## 2024-08-19 NOTE — ANESTHESIA PROCEDURE NOTES
Labor Epidural      Patient reassessed immediately prior to procedure    Patient location during procedure: OB  Start Time: 8/19/2024 6:00 PM  Stop Time: 8/19/2024 6:13 PM  Indication:at surgeon's request  Performed By  Anesthesiologist: Gilson Parsons MD  Preanesthetic Checklist  Completed: patient identified, risks and benefits discussed and timeout performed  Prep:  Pt Position:sitting  Sterile Tech:cap, gloves, mask and sterile barrier  Prep:chlorhexidine gluconate and isopropyl alcohol  Monitoring:blood pressure monitoring, continuous pulse oximetry and EKG  Epidural Block Procedure:  Approach:midline  Guidance:landmark technique and palpation technique  Location:L3-L4  Needle Type:Tuohy  Needle Gauge:17 G  Loss of Resistance Medium: saline  Loss of Resistance: 8cm  Cath Depth at skin:13 cm  Paresthesia: none  Aspiration:negative  Test Dose:negative  Number of Attempts: 1  Post Assessment:  Dressing:occlusive dressing applied and secured with tape  Pt Tolerance:patient tolerated the procedure well with no apparent complications  Complications:no

## 2024-08-19 NOTE — H&P
[facility]   Obstetrics and Gynecology   History & Physical    2024    Patient: Daisy Tate          MR#:[id number]    Chief complaint:  BPP 4/8, prolonged ROM    Subjective     34 y.o. female  at 39w1d presents from office with abnormal BPP.  Patient reports onset of leaking fluid from vagina sometime yesterday.  While she was in office today, Dr. Edwards noted continued leaking of fluid and found cervix was dilated to 4/70/-2.  BPP was 4 out of 8.  She was sent to the hospital for induction of labor.    Pregnancy has been complicated by bilateral renal pyelectasis with hydronephrosis.  Bladder also enlarged.  Other anatomy is normal.  Cell free DNA is low risk.  She has seen pediatric urology at [place] and plan is for renal imaging no sooner than 48 hours after delivery.  Our NICU is aware and fetal care plan is in place.    Pregnancy otherwise complicated by AMA status and anxiety.      Patient Active Problem List   Diagnosis    History of ectopic pregnancy    Anxiety    Gastroesophageal reflux disease without esophagitis    AMA (advanced maternal age) primigravida 35+, third trimester    Pyelectasis of fetus on prenatal ultrasound    39 weeks gestation of pregnancy    Abnormal fetal ultrasound    Prolonged spontaneous rupture of membranes       Past Medical History:   Diagnosis Date    Encounter for insertion of mirena IUD     History of fainting spells of unknown cause     Migraine     Pregnancy, ectopic, tubal        Past Surgical History:   Procedure Laterality Date    DIAGNOSTIC LAPAROSCOPY Left 11/10/2016    Procedure:  LAPAROSCOPIC LEFT SALPINGECTOMY ;  Surgeon: Marycruz Edwards MD;  Location: [facility];  Service:     MOUTH SURGERY      WISDOM TOOTH EXTRACTION         Obstetric History:  OB History          2    Para   0    Term   0       0    AB   1    Living   0         SAB   0    IAB   0    Ectopic   1    Molar        Multiple   0    Live Births                    Menstrual History:     Patient's last menstrual period was 11/19/2023.       # 1 - Date: 2016, Sex: None, Weight: None, GA: None, Type: None, Apgar1: None, Apgar5: None, Living: None, Birth Comments: None    # 2 - Date: None, Sex: None, Weight: None, GA: None, Type: None, Apgar1: None, Apgar5: None, Living: None, Birth Comments: None      Prenatal Information:  Prenatal Results       Initial Prenatal Labs       Test Value Reference Range Date Time    Hemoglobin  14.5 g/dL 11.1 - 15.9 01/08/24 1122    Hematocrit  42.8 % 34.0 - 46.6 01/08/24 1122    Platelets  235 x10E3/uL 150 - 450 01/08/24 1122    Rubella IgG  2.67 index Immune >0.99 01/08/24 1122    Hepatitis B SAg  Negative  Negative 01/08/24 1122    Hepatitis C Ab  Non Reactive  Non Reactive 01/08/24 1122    RPR  Non Reactive  Non Reactive 05/30/24 1203       Non Reactive  Non Reactive 01/08/24 1122    T. Pallidum Ab   Non-Reactive  Non-Reactive 08/19/24 1616    ABO  O   08/19/24 1616    Rh  Positive   08/19/24 1616    Antibody Screen  Negative  Negative 01/08/24 1122    HIV  Non Reactive  Non Reactive 01/08/24 1122    Urine Culture  Final report   01/08/24 1122    Gonorrhea  Negative  Negative 01/08/24 1131    Chlamydia  Negative  Negative 01/08/24 1131    TSH  3.160 uIU/mL 0.450 - 4.500 01/08/24 1122    HgB A1c   4.8 % 4.8 - 5.6 01/08/24 1122    Varicella IgG        Hemoglobinopathy Fractionation        Hemoglobinopathy (genetic testing)        Cystic fibrosis                   Fetal testing        Test Value Reference Range Date Time    NIPT        MSAFP  *Screen Negative*   03/07/24 1040    AFP-4                  2nd and 3rd Trimester       Test Value Reference Range Date Time    Hemoglobin (repeated)  12.2 g/dL 12.0 - 15.9 08/19/24 1616       12.7 g/dL 12.0 - 15.9 05/30/24 1203    Hematocrit (repeated)  36.5 % 34.0 - 46.6 08/19/24 1616       37.2 % 34.0 - 46.6 05/30/24 1203    Platelets   192 10*3/mm3 140 - 450 08/19/24 8001       194  10*3/mm3 140 - 450 05/30/24 1203       235 x10E3/uL 150 - 450 01/08/24 1122    1 hour GTT   143 mg/dL 65 - 139 05/30/24 1203    Antibody Screen (repeated)  Negative   08/19/24 1616    3rd TM syphilis scrn (repeated)  RPR   Non Reactive  Non Reactive 05/30/24 1203    3rd TM syphilis scrn (repeated) TP-Ab  Non-Reactive  Non-Reactive 08/19/24 1616    3rd TM syphilis screen TB-Ab (FTA)        Syphilis cascade test TP-Ab (EIA)        Syphilis cascade TPPA        GTT Fasting  86 mg/dL 70 - 94 06/03/24 0825    GTT 1 Hr  128 mg/dL 70 - 179 06/03/24 0825    GTT 2 Hr  114 mg/dL 70 - 154 06/03/24 0825    GTT 3 Hr  104 mg/dL 70 - 139 06/03/24 0825    Group B Strep  Negative  Negative 07/29/24               Other testing        Test Value Reference Range Date Time    Parvo IgG         CMV IgG                   Drug Screening       Test Value Reference Range Date Time    Amphetamine Screen        Barbiturate Screen        Benzodiazepine Screen        Methadone Screen        Phencyclidine Screen        Opiates Screen        THC Screen        Cocaine Screen        Propoxyphene Screen        Buprenorphine Screen        Methamphetamine Screen        Oxycodone Screen        Tricyclic Antidepressants Screen                  Legend    ^: Historical                          External Prenatal Results       Pregnancy Outside Results - Transcribed From Office Records - See Scanned Records For Details       Test Value Date Time    ABO  O  08/19/24 1616    Rh  Positive  08/19/24 1616    Antibody Screen  Negative  08/19/24 1616       Negative  01/08/24 1122    Varicella IgG       Rubella  2.67 index 01/08/24 1122    Hgb  12.2 g/dL 08/19/24 1616       12.7 g/dL 05/30/24 1203       14.5 g/dL 01/08/24 1122    Hct  36.5 % 08/19/24 1616       37.2 % 05/30/24 1203       42.8 % 01/08/24 1122    HgB A1c   4.8 % 01/08/24 1122    1h GTT  143 mg/dL 05/30/24 1203    3h GTT Fasting  86 mg/dL 06/03/24 0825    3h GTT 1 hour  128 mg/dL 06/03/24 0825    3h GTT  2 hour  114 mg/dL 06/03/24 0825    3h GTT 3 hour   104 mg/dL 06/03/24 0825    Gonorrhea (discrete)  Negative  01/08/24 1131    Chlamydia (discrete)  Negative  01/08/24 1131    RPR  Non Reactive  05/30/24 1203       Non Reactive  01/08/24 1122    Syphils cascade: TP-Ab (FTA)  Non-Reactive  08/19/24 1616    TP-Ab       TP-Ab (EIA)       TPPA       HBsAg  Negative  01/08/24 1122    Herpes Simplex Virus PCR       Herpes Simplex VIrus Culture       HIV  Non Reactive  01/08/24 1122    Hep C RNA Quant PCR       Hep C Antibody  Non Reactive  01/08/24 1122    AFP  42.3 ng/mL 03/07/24 1040    NIPT       Cystic Fibroisis        Group B Strep  Negative  07/29/24     GBS Susceptibility to Clindamycin       GBS Susceptibility to Erythromycin       Fetal Fibronectin       Genetic Testing, Maternal Blood                 Drug Screening       Test Value Date Time    Urine Drug Screen       Amphetamine Screen       Barbiturate Screen       Benzodiazepine Screen       Methadone Screen       Phencyclidine Screen       Opiates Screen       THC Screen       Cocaine Screen       Propoxyphene Screen       Buprenorphine Screen       Methamphetamine Screen       Oxycodone Screen       Tricyclic Antidepressants Screen                 Legend    ^: Historical                              History reviewed. No pertinent family history.    Social History     Tobacco Use    Smoking status: Never     Passive exposure: Never    Smokeless tobacco: Never   Vaping Use    Vaping status: Never Used   Substance Use Topics    Alcohol use: No    Drug use: No       Prednisone      Current Facility-Administered Medications:     acetaminophen (TYLENOL) tablet 650 mg, 650 mg, Oral, Q4H PRN, Azul Carr MD    diphenhydrAMINE (BENADRYL) injection 12.5 mg, 12.5 mg, Intravenous, Q8H PRN, Gilson Parsons MD    ePHEDrine injection 5 mg, 5 mg, Intravenous, Q15 Min PRN, Gilson Parsons MD    famotidine (PEPCID) injection 20 mg, 20 mg, Intravenous, BID  PRN **OR** famotidine (PEPCID) tablet 20 mg, 20 mg, Oral, BID PRN, Azul Carr MD    famotidine (PEPCID) injection 20 mg, 20 mg, Intravenous, Once PRN, Gilson Parsons MD    fentaNYL 2mcg/mL and ropivacaine 0.2% in NS epidural 100mL, 10 mL/hr, Epidural, Continuous, Gilson Parsons MD    lactated ringers infusion, 125 mL/hr, Intravenous, Continuous, Azul Carr MD, Last Rate: 125 mL/hr at 08/19/24 1617, 125 mL/hr at 08/19/24 1617    mineral oil liquid 30 mL, 30 mL, Topical, Once PRN, Azul Carr MD    ondansetron ODT (ZOFRAN-ODT) disintegrating tablet 4 mg, 4 mg, Oral, Q6H PRN **OR** ondansetron (ZOFRAN) injection 4 mg, 4 mg, Intravenous, Q6H PRN, Azul Carr MD    ondansetron (ZOFRAN) injection 4 mg, 4 mg, Intravenous, Once PRN, Gilson Parsons MD    oxytocin (PITOCIN) 30 units in 0.9% sodium chloride 500 mL (premix), 2-20 faisal-units/min, Intravenous, Titrated, Azul Carr MD    sodium chloride 0.9 % flush 10 mL, 10 mL, Intravenous, PRN, Azul Carr MD    terbutaline (BRETHINE) injection 0.25 mg, 0.25 mg, Subcutaneous, PRN, Azul Carr MD    Review of Systems  Review of Systems   All other systems reviewed and are negative.      Objective     Vital Signs  BP: (118)/(64) 118/64    Physical Exam:  Physical Exam  Vitals and nursing note reviewed.   Constitutional:       General: She is not in acute distress.     Appearance: Normal appearance.   HENT:      Head: Normocephalic and atraumatic.   Eyes:      Extraocular Movements: Extraocular movements intact.   Cardiovascular:      Rate and Rhythm: Normal rate.   Pulmonary:      Effort: Pulmonary effort is normal. No respiratory distress.   Abdominal:      General: There is no distension.      Palpations: Abdomen is soft. There is no mass.      Tenderness: There is no abdominal tenderness.   Genitourinary:     Comments: Cervix 4/70/-2, significant stool burden in rectal  vault  Musculoskeletal:         General: Normal range of motion.      Cervical back: Normal range of motion.   Skin:     General: Skin is warm and dry.   Neurological:      General: No focal deficit present.      Mental Status: She is alert and oriented to person, place, and time.   Psychiatric:         Mood and Affect: Mood normal.         Behavior: Behavior normal.           Hospital problem list:    Anxiety    AMA (advanced maternal age) primigravida 35+, third trimester    Pyelectasis of fetus on prenatal ultrasound    39 weeks gestation of pregnancy    Abnormal fetal ultrasound    Prolonged spontaneous rupture of membranes      Assessment & Plan     1. Intrauterine pregnancy at 39w1d presents with prolonged rupture of membranes and BPP 4 out of 8  -Recommend induction of labor.  Patient amenable.  Cervix unchanged since office exam several hours ago so we will initiate Pitocin.  Discussed monitoring temperature very closely.  -Reviewed procedure with patient.  I discussed the risks including but not limited to bleeding, infection and damage to internal organs.  Understanding of the procedure is voiced.     2.  Bilateral renal pyelectasis with hydronephrosis - s/p pediatric urology and [facility] consult (Progress Notes by Ayala Mishra APRN (08/14/2024 07:30) ), NICU aware, plan for renal imaging no sooner than 48 hours    Dispo: admit for IOL    Azul Nikole Carr MD  08/19/24  17:59 EDT      Patient Care Team:  Lonnie Gutierrez MD as PCP - General (Family Medicine)  Pedro Lopez MD (Inactive) as PCP - Ob/Gyn (Gynecology)

## 2024-08-20 LAB
ATMOSPHERIC PRESS: 751.4 MMHG
BASE EXCESS BLDCOA CALC-SCNC: -3.8 MMOL/L (ref -2–2)
BDY SITE: ABNORMAL
CO2 BLDA-SCNC: 25.7 MMOL/L (ref 23–27)
DEVICE COMMENT: ABNORMAL
HCO3 BLDCOA-SCNC: 24.1 MMOL/L (ref 22–28)
INHALED O2 CONCENTRATION: 21 %
MODALITY: ABNORMAL
PCO2 BLDCOA: 52.5 MMHG (ref 43–63)
PH BLDCOA: 7.27 PH UNITS (ref 7.18–7.34)
PO2 BLDCOA: <22.1 MMHG (ref 12–26)

## 2024-08-20 PROCEDURE — 25010000002 KETOROLAC TROMETHAMINE PER 15 MG: Performed by: STUDENT IN AN ORGANIZED HEALTH CARE EDUCATION/TRAINING PROGRAM

## 2024-08-20 PROCEDURE — 59510 CESAREAN DELIVERY: CPT | Performed by: STUDENT IN AN ORGANIZED HEALTH CARE EDUCATION/TRAINING PROGRAM

## 2024-08-20 RX ORDER — CARBOPROST TROMETHAMINE 250 UG/ML
250 INJECTION, SOLUTION INTRAMUSCULAR
Status: DISCONTINUED | OUTPATIENT
Start: 2024-08-20 | End: 2024-08-20 | Stop reason: HOSPADM

## 2024-08-20 RX ORDER — OXYCODONE HYDROCHLORIDE 10 MG/1
10 TABLET ORAL EVERY 4 HOURS PRN
Status: DISCONTINUED | OUTPATIENT
Start: 2024-08-20 | End: 2024-08-22 | Stop reason: HOSPADM

## 2024-08-20 RX ORDER — IBUPROFEN 600 MG/1
600 TABLET, FILM COATED ORAL EVERY 6 HOURS
Status: DISCONTINUED | OUTPATIENT
Start: 2024-08-21 | End: 2024-08-22 | Stop reason: HOSPADM

## 2024-08-20 RX ORDER — MISOPROSTOL 200 UG/1
800 TABLET ORAL ONCE AS NEEDED
Status: DISCONTINUED | OUTPATIENT
Start: 2024-08-20 | End: 2024-08-20 | Stop reason: HOSPADM

## 2024-08-20 RX ORDER — HYDROXYZINE HYDROCHLORIDE 50 MG/1
50 TABLET, FILM COATED ORAL EVERY 6 HOURS PRN
Status: DISCONTINUED | OUTPATIENT
Start: 2024-08-20 | End: 2024-08-22 | Stop reason: HOSPADM

## 2024-08-20 RX ORDER — METHYLERGONOVINE MALEATE 0.2 MG/ML
200 INJECTION INTRAVENOUS ONCE AS NEEDED
Status: DISCONTINUED | OUTPATIENT
Start: 2024-08-20 | End: 2024-08-20 | Stop reason: HOSPADM

## 2024-08-20 RX ORDER — OXYTOCIN/0.9 % SODIUM CHLORIDE 30/500 ML
125 PLASTIC BAG, INJECTION (ML) INTRAVENOUS ONCE AS NEEDED
Status: COMPLETED | OUTPATIENT
Start: 2024-08-20 | End: 2024-08-20

## 2024-08-20 RX ORDER — ACETAMINOPHEN 325 MG/1
650 TABLET ORAL EVERY 6 HOURS
Status: DISCONTINUED | OUTPATIENT
Start: 2024-08-21 | End: 2024-08-22 | Stop reason: HOSPADM

## 2024-08-20 RX ORDER — OXYCODONE HYDROCHLORIDE 5 MG/1
5 TABLET ORAL EVERY 4 HOURS PRN
Status: DISCONTINUED | OUTPATIENT
Start: 2024-08-20 | End: 2024-08-22 | Stop reason: HOSPADM

## 2024-08-20 RX ORDER — KETOROLAC TROMETHAMINE 15 MG/ML
15 INJECTION, SOLUTION INTRAMUSCULAR; INTRAVENOUS EVERY 6 HOURS
Status: COMPLETED | OUTPATIENT
Start: 2024-08-20 | End: 2024-08-21

## 2024-08-20 RX ORDER — ONDANSETRON 4 MG/1
4 TABLET, ORALLY DISINTEGRATING ORAL EVERY 4 HOURS PRN
Status: DISCONTINUED | OUTPATIENT
Start: 2024-08-20 | End: 2024-08-22 | Stop reason: HOSPADM

## 2024-08-20 RX ORDER — OXYTOCIN/0.9 % SODIUM CHLORIDE 30/500 ML
250 PLASTIC BAG, INJECTION (ML) INTRAVENOUS CONTINUOUS
Status: ACTIVE | OUTPATIENT
Start: 2024-08-20 | End: 2024-08-20

## 2024-08-20 RX ORDER — AMOXICILLIN 250 MG
1 CAPSULE ORAL 2 TIMES DAILY
Status: DISCONTINUED | OUTPATIENT
Start: 2024-08-20 | End: 2024-08-22 | Stop reason: HOSPADM

## 2024-08-20 RX ORDER — ACETAMINOPHEN 500 MG
1000 TABLET ORAL EVERY 6 HOURS
Status: COMPLETED | OUTPATIENT
Start: 2024-08-20 | End: 2024-08-20

## 2024-08-20 RX ORDER — OXYTOCIN/0.9 % SODIUM CHLORIDE 30/500 ML
999 PLASTIC BAG, INJECTION (ML) INTRAVENOUS ONCE
Status: DISCONTINUED | OUTPATIENT
Start: 2024-08-20 | End: 2024-08-22 | Stop reason: HOSPADM

## 2024-08-20 RX ADMIN — SENNOSIDES AND DOCUSATE SODIUM 1 TABLET: 50; 8.6 TABLET ORAL at 08:22

## 2024-08-20 RX ADMIN — KETOROLAC TROMETHAMINE 30 MG: 30 INJECTION, SOLUTION INTRAMUSCULAR at 01:26

## 2024-08-20 RX ADMIN — KETOROLAC TROMETHAMINE 15 MG: 15 INJECTION, SOLUTION INTRAMUSCULAR; INTRAVENOUS at 08:22

## 2024-08-20 RX ADMIN — ACETAMINOPHEN 1000 MG: 500 TABLET ORAL at 17:30

## 2024-08-20 RX ADMIN — ACETAMINOPHEN 1000 MG: 500 TABLET ORAL at 12:38

## 2024-08-20 RX ADMIN — KETOROLAC TROMETHAMINE 15 MG: 15 INJECTION, SOLUTION INTRAMUSCULAR; INTRAVENOUS at 14:30

## 2024-08-20 RX ADMIN — ACETAMINOPHEN 1000 MG: 500 TABLET ORAL at 23:25

## 2024-08-20 RX ADMIN — KETOROLAC TROMETHAMINE 15 MG: 15 INJECTION, SOLUTION INTRAMUSCULAR; INTRAVENOUS at 20:15

## 2024-08-20 RX ADMIN — ACETAMINOPHEN 1000 MG: 500 TABLET ORAL at 04:48

## 2024-08-20 RX ADMIN — SENNOSIDES AND DOCUSATE SODIUM 1 TABLET: 50; 8.6 TABLET ORAL at 20:15

## 2024-08-20 RX ADMIN — Medication 125 ML/HR: at 00:54

## 2024-08-20 NOTE — PROGRESS NOTES
Patient has had recurrent late decelerations for 1 hour despite resuscitative maneuvers.  Pitocin has been turned off.  IUPC placed and amnioinfusion initiated.  Cervix is unchanged at 4/70/-2.  I discussed with patient and her partner that if monitoring does not improve with amnioinfusion, I would recommend proceeding with  for persistent category 2 tracing.  They are amenable to this.  Continue to monitor closely and will reassess soon.    Azul Carr MD  24  21:48 EDT

## 2024-08-20 NOTE — L&D DELIVERY NOTE
[facility]   Obstetrics and Gynecology     2024    Patient:Daisy Tate   MR#:[id number]     Section Procedure Note    Indications:  Persistent category 2 tracing remote from delivery    Pre-operative Diagnosis: Intrauterine pregnancy at 39w2d    Post-operative Diagnosis: same    Procedure:  Low transverse  section     Surgeon: Azul Carr MD     Assistants: Mayank Suresh MD and Minnie Molina MD    Anesthesia: Combined spinal epidural    Prenatal care problem list:  Patient Active Problem List   Diagnosis    History of ectopic pregnancy    Anxiety    Gastroesophageal reflux disease without esophagitis    AMA (advanced maternal age) primigravida 35+, third trimester    Pyelectasis of fetus on prenatal ultrasound    39 weeks gestation of pregnancy    Abnormal fetal ultrasound    Prolonged spontaneous rupture of membranes     delivery delivered       Procedure Details   The patient was seen in the LDR preoperatively. The risks, benefits, complications, treatment options, and expected outcomes were discussed with the patient.  The patient concurred with the proposed plan, giving informed consent.  The site of surgery is discussed. The patient was taken to Operating Room # 1, identified as Daisy Tate and the procedure verified as  Delivery. A Time Out was held and the above information confirmed.    After induction of anesthesia, the patient was draped and prepped in the usual sterile manner. A Pfannenstiel incision was made and carried down through the subcutaneous tissue to the fascia. Fascial incision was made and extended transversely. The fascia was  from the underlying rectus tissue superiorly and inferiorly. The peritoneum was identified and entered. Peritoneal incision was extended longitudinally.  A low transverse uterine incision was made.  Delivered from vertex presentation was a male  fetus 3190 g (7 lb 0.5 oz)  with Apgar scores  of 7 at one minute and 9 at five minutes. Umbilical cord was clamped and cut after a 30-second delay.  Cord blood was obtained for evaluation. The placenta was removed intact and appeared normal. The uterine outline, tubes and ovaries appeared normal.  The uterine incision was closed with running locked sutures of 0 monocryl. A second imbricating layer of the same suture was placed.  Excellent hemostasis was observed.  The posterior cul-de-sac was cleared of all blood.  The uterus was returned to the abdomen.  The paracolic gutters were cleared of all blood.  The uterus was reexamined and excellent hemostasis was confirmed.    The fascia was then reapproximated with running sutures of 0 Vicryl.  OB anesthetic cocktail was injected into subcutaneous layer.  The deep subcutaneous layer was reapproximated with 3-0 monocryl in a running fashion. The skin was reapproximated with 4-0 monocryl in a subcuticular fashion.     Instrument, sponge, and needle counts were correct prior to abdominal closure and at the conclusion of the case.     Surgical assistant was responsible for performing the following activities: Retraction, Suction, Irrigation, Closing, Placing Dressing and Delivery of Fetus and their skilled assistance was necessary for the success of this case.    Findings:  YOB: 2026   Time of birth: 11:18 PM   Live, viable male infant  Baby weight: 3190 g (7 lb 0.5 oz)             APGARS  One minute Five minutes   Skin color: 0   1     Heart rate: 2   2     Grimace: 2   2     Muscle tone: 1   2     Breathin   2     Totals: 7   9       Normal gravid uterus  Normal bilateral fallopian tubes and ovaries    Estimated Blood Loss:  300 mL    Calculated Blood Loss:  Quantitative Blood Loss (mL): 479 mL           Specimens:  Placenta            Complications:  None; patient tolerated the procedure well.           Disposition: PACU - hemodynamically stable.           Condition: stable    Cord gases:    pH,  Cord Venous   Date Value Ref Range Status   08/19/2024 7.371 7.260 - 7.400 pH Units Final     Comment:     Serial Number: [id number]:  [id number]     Base Excess, Cord Venous   Date Value Ref Range Status   08/19/2024 -3.8 -30.0 - 30.0 mmol/L Final       Azul Carr MD  8/20/2024   01:58 EDT

## 2024-08-20 NOTE — PLAN OF CARE
Goal Outcome Evaluation:  Plan of Care Reviewed With: patient        Progress: improving  Outcome Evaluation: Pt , 39w2d. Pt transferred from labor to C/S due to fetal intolerance. Infant male delivered at 2318 on 24. Pt entered PACU at 0000. Uterus firm, midline, 1 cm below umbilicus. Bleeding scant, no odor. Pain level verbalized 0/10. Pt currently upstairs on post-partum unit with baby. No concerns noted.

## 2024-08-20 NOTE — PROGRESS NOTES
EFW shows continued recurrent late decelerations despite all resuscitative measures including amnioinfusion.  Discussed findings and inability to augment labor with patient and her partner.  I recommend  for persistent category 2 tracing remote from delivery.  They are amenable and would like to move forward.  L&D team notified.    Azul Carr MD  24  22:26 EDT

## 2024-08-20 NOTE — ANESTHESIA POSTPROCEDURE EVALUATION
Patient: Daisy Tate    Procedure Summary       Date: 24 Room / Location:  [facility]   [facility]    Anesthesia Start: 1800 Anesthesia Stop: 24 0001    Procedure:  SECTION PRIMARY (Abdomen) Diagnosis:     Surgeons: Azul Carr MD Provider: Kishan Stevenson MD    Anesthesia Type: epidural ASA Status: 2            Anesthesia Type: epidural    Vitals  Vitals Value Taken Time   /57 24 0200   Temp 37.1 °C (98.8 °F) 24 0200   Pulse 86 24 0200   Resp 16 24 0200   SpO2 99 % 24 0200           Post Anesthesia Care and Evaluation    Post Neuraxial Block status: No signs or symptoms of PDPH

## 2024-08-20 NOTE — PLAN OF CARE
Goal Outcome Evaluation:  Plan of Care Reviewed With: patient, spouse        Progress: improving  Outcome Evaluation: vitals stable, assessment wdl, cath in, on dura checks, pain controlled per meds

## 2024-08-21 LAB
BASOPHILS # BLD AUTO: 0.02 10*3/MM3 (ref 0–0.2)
BASOPHILS NFR BLD AUTO: 0.1 % (ref 0–1.5)
DEPRECATED RDW RBC AUTO: 37.8 FL (ref 37–54)
EOSINOPHIL # BLD AUTO: 0.15 10*3/MM3 (ref 0–0.4)
EOSINOPHIL NFR BLD AUTO: 1.1 % (ref 0.3–6.2)
ERYTHROCYTE [DISTWIDTH] IN BLOOD BY AUTOMATED COUNT: 12.5 % (ref 12.3–15.4)
HCT VFR BLD AUTO: 26.6 % (ref 34–46.6)
HGB BLD-MCNC: 9 G/DL (ref 12–15.9)
IMM GRANULOCYTES # BLD AUTO: 0.2 10*3/MM3 (ref 0–0.05)
IMM GRANULOCYTES NFR BLD AUTO: 1.5 % (ref 0–0.5)
LYMPHOCYTES # BLD AUTO: 1.01 10*3/MM3 (ref 0.7–3.1)
LYMPHOCYTES NFR BLD AUTO: 7.4 % (ref 19.6–45.3)
MCH RBC QN AUTO: 28.5 PG (ref 26.6–33)
MCHC RBC AUTO-ENTMCNC: 33.8 G/DL (ref 31.5–35.7)
MCV RBC AUTO: 84.2 FL (ref 79–97)
MONOCYTES # BLD AUTO: 0.27 10*3/MM3 (ref 0.1–0.9)
MONOCYTES NFR BLD AUTO: 2 % (ref 5–12)
NEUTROPHILS NFR BLD AUTO: 12.05 10*3/MM3 (ref 1.7–7)
NEUTROPHILS NFR BLD AUTO: 87.9 % (ref 42.7–76)
NRBC BLD AUTO-RTO: 0 /100 WBC (ref 0–0.2)
PLATELET # BLD AUTO: 160 10*3/MM3 (ref 140–450)
PMV BLD AUTO: 9.7 FL (ref 6–12)
RBC # BLD AUTO: 3.16 10*6/MM3 (ref 3.77–5.28)
WBC NRBC COR # BLD AUTO: 13.7 10*3/MM3 (ref 3.4–10.8)

## 2024-08-21 PROCEDURE — 85025 COMPLETE CBC W/AUTO DIFF WBC: CPT | Performed by: STUDENT IN AN ORGANIZED HEALTH CARE EDUCATION/TRAINING PROGRAM

## 2024-08-21 PROCEDURE — 25010000002 KETOROLAC TROMETHAMINE PER 15 MG: Performed by: STUDENT IN AN ORGANIZED HEALTH CARE EDUCATION/TRAINING PROGRAM

## 2024-08-21 PROCEDURE — 0503F POSTPARTUM CARE VISIT: CPT | Performed by: OBSTETRICS & GYNECOLOGY

## 2024-08-21 RX ADMIN — IBUPROFEN 600 MG: 600 TABLET, FILM COATED ORAL at 16:10

## 2024-08-21 RX ADMIN — ACETAMINOPHEN 325MG 650 MG: 325 TABLET ORAL at 05:38

## 2024-08-21 RX ADMIN — IBUPROFEN 600 MG: 600 TABLET, FILM COATED ORAL at 09:48

## 2024-08-21 RX ADMIN — ACETAMINOPHEN 325MG 325 MG: 325 TABLET ORAL at 12:11

## 2024-08-21 RX ADMIN — SENNOSIDES AND DOCUSATE SODIUM 1 TABLET: 50; 8.6 TABLET ORAL at 09:48

## 2024-08-21 RX ADMIN — ACETAMINOPHEN 325MG 650 MG: 325 TABLET ORAL at 19:14

## 2024-08-21 RX ADMIN — OXYCODONE HYDROCHLORIDE 5 MG: 5 TABLET ORAL at 11:11

## 2024-08-21 RX ADMIN — SENNOSIDES AND DOCUSATE SODIUM 1 TABLET: 50; 8.6 TABLET ORAL at 21:45

## 2024-08-21 RX ADMIN — KETOROLAC TROMETHAMINE 15 MG: 15 INJECTION, SOLUTION INTRAMUSCULAR; INTRAVENOUS at 02:35

## 2024-08-21 RX ADMIN — IBUPROFEN 600 MG: 600 TABLET, FILM COATED ORAL at 21:45

## 2024-08-21 NOTE — PLAN OF CARE
Goal Outcome Evaluation:         Progressing well, pain controlled, voids without diff, minimal bleeding, bottle feeding

## 2024-08-21 NOTE — PROGRESS NOTES
[facility]   Obstetrics and Gynecology     2024    Patient: Daisy Tate   MR#:[id number]        Progress note         HD#2  Post-Op Day 1 S/P    Delivered a male infant.    Subjective     Daisy Tate is a 34 y.o. female  post operative from CS at 39w1d weeks  Patient reports:  Pain is well controlled. Voiding and ambulating without difficulty.  Tolerating po. Lochia normal.     Breast/bottle The patient is not currently breastfeeding.    Patient Active Problem List   Diagnosis    History of ectopic pregnancy    Anxiety    Gastroesophageal reflux disease without esophagitis    AMA (advanced maternal age) primigravida 35+, third trimester    Pyelectasis of fetus on prenatal ultrasound    39 weeks gestation of pregnancy    Abnormal fetal ultrasound    Prolonged spontaneous rupture of membranes     delivery delivered        Objective      Vital Signs Range for the last 24 hours    Temperature: Temp:  [97.8 °F (36.6 °C)-98.1 °F (36.7 °C)] 98.1 °F (36.7 °C)  BP:  BP: ()/(53-65) 99/65  Pulse:  Heart Rate:  [64-89] 75  Respirations: Resp:  [16] 16  Weight: 83 kg (183 lb)   BMI:  Body mass index is 30.45 kg/m².    I/O last 3 completed shifts:  In: 2394 [I.V.:2144; IV Piggyback:250]  Out: 2754 [Urine:2275; Blood:479]  I/O this shift:  In: 1100 [P.O.:1100]  Out: 400 [Urine:400]     Physical Exam  Vitals and nursing note reviewed.   Constitutional:       Appearance: Normal appearance. She is normal weight.   Pulmonary:      Effort: Pulmonary effort is normal.   Neurological:      Mental Status: She is alert and oriented to person, place, and time.   Psychiatric:         Mood and Affect: Mood normal.         Behavior: Behavior normal.         LABS:    Results from last 7 days   Lab Units 24  0629 24  1616   WBC 10*3/mm3 13.70* 16.91*   HEMOGLOBIN g/dL 9.0* 12.2   HEMATOCRIT % 26.6* 36.5   PLATELETS 10*3/mm3 160 192     Results from last 7 days   Lab  Units 24  1616   SODIUM mmol/L 132*   POTASSIUM mmol/L 3.9   CHLORIDE mmol/L 102   CO2 mmol/L 19.0*   BUN mg/dL 7   CREATININE mg/dL 0.71   CALCIUM mg/dL 9.2   BILIRUBIN mg/dL 0.4   ALK PHOS U/L 156*   ALT (SGPT) U/L 11   AST (SGOT) U/L 18   GLUCOSE mg/dL 106*         Assessment & Plan     1.  POD #1 S/P C/S:  Hemodynamically stable.  Doing well.     Anxiety    AMA (advanced maternal age) primigravida 35+, third trimester    Pyelectasis of fetus on prenatal ultrasound    39 weeks gestation of pregnancy    Abnormal fetal ultrasound    Prolonged spontaneous rupture of membranes     delivery delivered      Plan:    Continue routine postoperative care   Infant circumcision:  Procedure reviewed with the patient including risk, benefits and elective nature of the procedure          Keren Chaves MD  2024  11:00 EDT

## 2024-08-22 VITALS
BODY MASS INDEX: 30.49 KG/M2 | DIASTOLIC BLOOD PRESSURE: 56 MMHG | HEIGHT: 65 IN | SYSTOLIC BLOOD PRESSURE: 109 MMHG | TEMPERATURE: 98.6 F | RESPIRATION RATE: 16 BRPM | HEART RATE: 67 BPM | OXYGEN SATURATION: 97 % | WEIGHT: 183 LBS

## 2024-08-22 PROBLEM — Z3A.39 39 WEEKS GESTATION OF PREGNANCY: Status: RESOLVED | Noted: 2024-08-19 | Resolved: 2024-08-22

## 2024-08-22 PROCEDURE — 0503F POSTPARTUM CARE VISIT: CPT | Performed by: OBSTETRICS & GYNECOLOGY

## 2024-08-22 RX ORDER — HYDROCODONE BITARTRATE AND ACETAMINOPHEN 5; 325 MG/1; MG/1
1 TABLET ORAL EVERY 6 HOURS PRN
Qty: 14 TABLET | Refills: 0 | Status: SHIPPED | OUTPATIENT
Start: 2024-08-22

## 2024-08-22 RX ORDER — IBUPROFEN 600 MG/1
600 TABLET, FILM COATED ORAL EVERY 6 HOURS PRN
Qty: 30 TABLET | Refills: 1 | Status: SHIPPED | OUTPATIENT
Start: 2024-08-22

## 2024-08-22 RX ADMIN — IBUPROFEN 600 MG: 600 TABLET, FILM COATED ORAL at 06:36

## 2024-08-22 RX ADMIN — SENNOSIDES AND DOCUSATE SODIUM 1 TABLET: 50; 8.6 TABLET ORAL at 08:10

## 2024-08-22 RX ADMIN — ACETAMINOPHEN 325MG 650 MG: 325 TABLET ORAL at 01:45

## 2024-08-22 RX ADMIN — IBUPROFEN 600 MG: 600 TABLET, FILM COATED ORAL at 10:21

## 2024-08-22 RX ADMIN — ACETAMINOPHEN 325MG 650 MG: 325 TABLET ORAL at 08:11

## 2024-08-22 NOTE — PLAN OF CARE
Goal Outcome Evaluation:           Progress: improving  Outcome Evaluation: VSS, pain wlell controlled, ambulating well, d/c today

## 2024-08-22 NOTE — PROGRESS NOTES
[facility]   Obstetrics and Gynecology     2024    Name:Daisy Tate    MR#:[id number]     Progress Note:  Post-Op    HD:3    Subjective   34 y.o. yo Female  s/p CS at 39w1d doing well. Pain well controlled. Tolerating regular diet and having flatus. Lochia normal.     Patient Active Problem List   Diagnosis    History of ectopic pregnancy    Anxiety    Gastroesophageal reflux disease without esophagitis    AMA (advanced maternal age) primigravida 35+, third trimester    Pyelectasis of fetus on prenatal ultrasound    Abnormal fetal ultrasound    Prolonged spontaneous rupture of membranes     delivery delivered        Objective    Vitals  Temp:  Temp:  [97.6 °F (36.4 °C)-98.6 °F (37 °C)] 98.6 °F (37 °C)  Temp src: Oral  BP:  BP: (107-114)/(56-66) 109/56  Pulse:  Heart Rate:  [67-81] 67  RR:   Resp:  [16] 16  Weight: 83 kg (183 lb)  BMI:  Body mass index is 30.45 kg/m².    Physical Exam  Vitals and nursing note reviewed.   Constitutional:       General: She is not in acute distress.     Appearance: Normal appearance. She is well-developed. She is not ill-appearing.   HENT:      Head: Normocephalic.   Pulmonary:      Effort: Pulmonary effort is normal.   Abdominal:      General: Abdomen is flat. There is no distension.      Palpations: Abdomen is soft. There is no mass.      Tenderness: There is no abdominal tenderness.   Genitourinary:     Vagina: Normal.      Comments: Lochia is scant  Fundus is firm    Incision:  dry/clean/intact  Musculoskeletal:         General: No swelling or tenderness.   Neurological:      Mental Status: She is alert and oriented to person, place, and time.   Psychiatric:         Behavior: Behavior normal.         Thought Content: Thought content normal.         Judgment: Judgment normal.         I/O last 3 completed shifts:  In: 1500 [P.O.:1500]  Out: 800 [Urine:800]    LABS:  Results from last 7 days   Lab Units 24  6247  24  1616   WBC 10*3/mm3 13.70* 16.91*   HEMOGLOBIN g/dL 9.0* 12.2   HEMATOCRIT % 26.6* 36.5   PLATELETS 10*3/mm3 160 192     Results from last 7 days   Lab Units 24  1616   SODIUM mmol/L 132*   POTASSIUM mmol/L 3.9   CHLORIDE mmol/L 102   CO2 mmol/L 19.0*   BUN mg/dL 7   CREATININE mg/dL 0.71   CALCIUM mg/dL 9.2   BILIRUBIN mg/dL 0.4   ALK PHOS U/L 156*   ALT (SGPT) U/L 11   AST (SGOT) U/L 18   GLUCOSE mg/dL 106*       Infant: male       Assessment    1.  POD#2     Anxiety    AMA (advanced maternal age) primigravida 35+, third trimester    Pyelectasis of fetus on prenatal ultrasound    Abnormal fetal ultrasound    Prolonged spontaneous rupture of membranes     delivery delivered      Plan:  Continue routine postoperative care     Jose Mckenzie MD  2024 08:20 EDT

## 2024-08-22 NOTE — PLAN OF CARE
Goal Outcome Evaluation:  Plan of Care Reviewed With: patient, spouse        Progress: improving  Outcome Evaluation: vss. ambulating independently. pain controlled with eras meds, prn yumiko and ice packs. fundus and lochia. incision TITUS. bonding well with infant.

## 2024-08-23 NOTE — DISCHARGE SUMMARY
[facility]   Obstetrics and Gynecology    Section Discharge Summary    Date of Admission: 2024  Date of Discharge:  2024      Patient: Daisy Tate      MR#:[id number]    Surgeon/OB: Azul Carr MD    Discharge Diagnosis:    section at 39w1d, uncomplicated recovery     delivery delivered    Anxiety    AMA (advanced maternal age) primigravida 35+, third trimester    Pyelectasis of fetus on prenatal ultrasound    Abnormal fetal ultrasound    Prolonged spontaneous rupture of membranes    Procedures:  , Low Transverse     2024    11:18 PM        Anesthesia:  Epidural;Spinal     Hospital Course  Patient is a 34 y.o. female  at 39w1d status post  section with uneventful postoperative recovery.  Patient was advanced to regular diet on postoperative day#1.  On discharge, ambulating, tolerating a regular diet without any difficulties and her incision is dry, clean and intact.     Infant:   male  fetus 3190 g (7 lb 0.5 oz)  with Apgar scores of 7 , 9  at five minutes.    Condition on Discharge:  Stable    Vital Signs  Temp:  [97.6 °F (36.4 °C)-98.6 °F (37 °C)] 98.6 °F (37 °C)  Heart Rate:  [67-81] 67  Resp:  [16] 16  BP: (109-114)/(56-62) 109/56    Results from last 7 days   Lab Units 24  0629 24  1616   WBC 10*3/mm3 13.70* 16.91*   HEMOGLOBIN g/dL 9.0* 12.2   HEMATOCRIT % 26.6* 36.5   PLATELETS 10*3/mm3 160 192     Results from last 7 days   Lab Units 24  1616   SODIUM mmol/L 132*   POTASSIUM mmol/L 3.9   CHLORIDE mmol/L 102   CO2 mmol/L 19.0*   BUN mg/dL 7   CREATININE mg/dL 0.71   CALCIUM mg/dL 9.2   BILIRUBIN mg/dL 0.4   ALK PHOS U/L 156*   ALT (SGPT) U/L 11   AST (SGOT) U/L 18   GLUCOSE mg/dL 106*         Discharge Disposition  Home or Self Care    Discharge Medications     Your medication list        START taking these medications        Instructions Last Dose Given Next Dose Due   HYDROcodone-acetaminophen  5-325 MG per tablet  Commonly known as: Norco      Take 1 tablet by mouth Every 6 (Six) Hours As Needed for Moderate Pain.       ibuprofen 600 MG tablet  Commonly known as: ADVIL,MOTRIN      Take 1 tablet by mouth Every 6 (Six) Hours As Needed for Moderate Pain.              CONTINUE taking these medications        Instructions Last Dose Given Next Dose Due   ondansetron ODT 4 MG disintegrating tablet  Commonly known as: ZOFRAN-ODT      Take 1 tablet by mouth Every 6 (Six) Hours As Needed for Nausea or Vomiting.       pantoprazole 40 MG EC tablet  Commonly known as: Protonix      Take 1 tablet by mouth Daily.       prenatal vitamin 27-0.8 27-0.8 MG tablet tablet      Take 1 tablet by mouth Daily.              STOP taking these medications      aspirin 81 MG EC tablet                  Where to Get Your Medications        These medications were sent to [place] [id number]      Hours: Monday to Friday 7 AM to 6 PM, Saturday & Sunday 8 AM to 4:30 PM (Closed 12 PM to 12:30 PM) Phone: [phone]   HYDROcodone-acetaminophen 5-325 MG per tablet  ibuprofen 600 MG tablet           Discharge Diet: Regular    Follow-up Appointments  Future Appointments   Date Time Provider Department Center   9/12/2024  9:15 AM Marycruz Blankenship MD [facility]  [facility]     Additional Instructions for the Follow-ups that You Need to Schedule       Call MD for problems / concerns.   As directed      Call for any problems with  1.  Heavy vaginal bleeding (greater than 2 pads an hours for 2 hours)  2.  Fever above 101.3  3.  Incisional redness  4.  Signs of Preeclampsia:  headache, visual changes or elevated blood pressure    Order Comments: Call for any problems with 1.  Heavy vaginal bleeding (greater than 2 pads an hours for 2 hours) 2.  Fever above 101.3 3.  Incisional redness 4.  Signs of Preeclampsia:  headache, visual changes or elevated blood pressure                 Prenatal  labs/vax:   Immunization History   Administered Date(s) Administered    COVID-19 (MODERNA) 1st,2nd,3rd Dose Monovalent 03/17/2021, 04/14/2021    COVID-19 (MODERNA) BIVALENT 12+YRS 09/25/2022    Tdap 06/18/2024       External Prenatal Results       Pregnancy Outside Results - Transcribed From Office Records - See Scanned Records For Details       Test Value Date Time    ABO  O  08/19/24 1616    Rh  Positive  08/19/24 1616    Antibody Screen  Negative  08/19/24 1616       Negative  01/08/24 1122    Varicella IgG       Rubella  2.67 index 01/08/24 1122    Hgb  9.0 g/dL 08/21/24 0629       12.2 g/dL 08/19/24 1616       12.7 g/dL 05/30/24 1203       14.5 g/dL 01/08/24 1122    Hct  26.6 % 08/21/24 0629       36.5 % 08/19/24 1616       37.2 % 05/30/24 1203       42.8 % 01/08/24 1122    HgB A1c   4.8 % 01/08/24 1122    1h GTT  143 mg/dL 05/30/24 1203    3h GTT Fasting  86 mg/dL 06/03/24 0825    3h GTT 1 hour  128 mg/dL 06/03/24 0825    3h GTT 2 hour  114 mg/dL 06/03/24 0825    3h GTT 3 hour   104 mg/dL 06/03/24 0825    Gonorrhea (discrete)  Negative  01/08/24 1131    Chlamydia (discrete)  Negative  01/08/24 1131    RPR  Non Reactive  05/30/24 1203       Non Reactive  01/08/24 1122    Syphils cascade: TP-Ab (FTA)  Non-Reactive  08/19/24 1616    TP-Ab       TP-Ab (EIA)       TPPA       HBsAg  Negative  01/08/24 1122    Herpes Simplex Virus PCR       Herpes Simplex VIrus Culture       HIV  Non Reactive  01/08/24 1122    Hep C RNA Quant PCR       Hep C Antibody  Non Reactive  01/08/24 1122    AFP  42.3 ng/mL 03/07/24 1040    NIPT       Cystic Fibroisis        Group B Strep  Negative  07/29/24     GBS Susceptibility to Clindamycin       GBS Susceptibility to Erythromycin       Fetal Fibronectin       Genetic Testing, Maternal Blood                 Drug Screening       Test Value Date Time    Urine Drug Screen       Amphetamine Screen       Barbiturate Screen       Benzodiazepine Screen       Methadone Screen       Phencyclidine  Screen       Opiates Screen       THC Screen       Cocaine Screen       Propoxyphene Screen       Buprenorphine Screen       Methamphetamine Screen       Oxycodone Screen       Tricyclic Antidepressants Screen                 Legend    ^: Historical                              Jose Mckenzie MD  8/22/2024  20:20 EDT

## 2024-09-05 ENCOUNTER — MATERNAL SCREENING (OUTPATIENT)
Dept: CALL CENTER | Facility: HOSPITAL | Age: 34
End: 2024-09-05
Payer: COMMERCIAL

## 2024-09-05 NOTE — OUTREACH NOTE
Maternal Screening Survey      Flowsheet Row Responses   Facility patient discharged from? [place]   Attempt successful? Yes   Call start time 1500   Call end time 1502   Person spoke with today (if not patient) and relationship patient   EPD Scale: Able to Laugh 0-->as much as she always could   EPD Scale: Looked Forward 0-->as much as she ever did   EPD Scale: Blamed Self 0-->no, never   EPD Scale: Been Anxious 0-->no, not at all   EPD Scale: Felt Panicky 0-->no, not at all   EPD Scale: Things Getting on Top 0-->no, has been coping as well as ever   EPD Scale: Difficulty Sleeping 0-->no, not at all   EPD Scale: Sad or Miserable 0-->no, not at all   EPD Scale: Crying 0-->no, never   EPD Scale: Thought of Harming Self 0-->never   [place]  Depression Score 0   Did any of your parents have problems with alcohol or drug use? No   Do any of your peers have problems with alcohol or drug use? No   Does your partner have problems with alcohol or drug use? No   Before you were pregnant did you have problems with alcohol or drug use? (past) No   In the past month, did you drink beer, wine, liquor or use any other drugs? (pregnancy) No   Maternal Screening call completed Yes   Call end time 1502              Haroldo CUADRA - Registered Nurse

## 2024-09-05 NOTE — OUTREACH NOTE
Maternal Screening Survey      Flowsheet Row Responses   Facility patient discharged from? [facility]   Attempt successful? No   Unsuccessful attempts Attempt 1              Haroldo CUADRA - Registered Nurse

## 2024-09-12 ENCOUNTER — POSTPARTUM VISIT (OUTPATIENT)
Dept: OBSTETRICS AND GYNECOLOGY | Age: 34
End: 2024-09-12
Payer: COMMERCIAL

## 2024-09-12 VITALS
DIASTOLIC BLOOD PRESSURE: 64 MMHG | BODY MASS INDEX: 27.66 KG/M2 | WEIGHT: 166 LBS | SYSTOLIC BLOOD PRESSURE: 108 MMHG | HEIGHT: 65 IN

## 2024-09-12 NOTE — PROGRESS NOTES
GYN Visit    2024    Patient: Daisy Tate          MR#:[id number]      Chief Complaint   Patient presents with    Postpartum Care     Post Partum - CS delivery on 24, baby boy 7lb 0.5oz (Ramírez), pt is bottle feeding, pt has no complaints today       History of Present Illness    34 y.o. female  who presents for postpartum visit    Patient had a  for fetal intolerance to labor  She had a baby boy  He had renal pyelectasis prior to delivery and is being followed by a peds urologist  So far they are just watching and waiting and things are looking good  Patient is in good spirits and has no baby blues  She plans on using birth control pills as pregnancy prevention  She is bottlefeeding  She will not be due for Pap smear  Discussed lifting restrictions  Follow-up 3 to 4 weeks        Patient's last menstrual period was 2023.    ________________________________________  Patient Active Problem List   Diagnosis    History of ectopic pregnancy    Anxiety    Gastroesophageal reflux disease without esophagitis    AMA (advanced maternal age) primigravida 35+, third trimester    Pyelectasis of fetus on prenatal ultrasound    Abnormal fetal ultrasound    Prolonged spontaneous rupture of membranes     delivery delivered       Past Medical History:   Diagnosis Date    Encounter for insertion of mirena IUD     History of fainting spells of unknown cause     Migraine     Pregnancy, ectopic, tubal 2016       Past Surgical History:   Procedure Laterality Date     SECTION N/A 2024    Procedure:  SECTION PRIMARY;  Surgeon: Azul Carr MD;  Location: [facility] LABOR DELIVERY;  Service: Obstetrics/Gynecology;  Laterality: N/A;    DIAGNOSTIC LAPAROSCOPY Left 11/10/2016    Procedure:  LAPAROSCOPIC LEFT SALPINGECTOMY ;  Surgeon: Marycruz Edwards MD;  Location: [facility] OR;  Service:     MOUTH SURGERY      WISDOM TOOTH EXTRACTION         Social History  "    Tobacco Use   Smoking Status Never    Passive exposure: Never   Smokeless Tobacco Never       has a current medication list which includes the following prescription(s): prenatal vitamin 27-0.8.  ________________________________________    Current contraception: abstinence      The following portions of the patient's history were reviewed and updated as appropriate: allergies, current medications, past family history, past medical history, past social history, past surgical history, and problem list.    Review of Systems    Pertinent items are noted in HPI.     Objective   Physical Exam    /64   Ht 165.1 cm (65\")   Wt 75.3 kg (166 lb)   LMP 2023   Breastfeeding No Comment: bottle feeding - going well  BMI 27.62 kg/m²    BP Readings from Last 3 Encounters:   24 108/64   24 109/56   24 118/64      Wt Readings from Last 3 Encounters:   24 75.3 kg (166 lb)   24 83 kg (183 lb)   24 83.3 kg (183 lb 9.6 oz)      BMI: Estimated body mass index is 27.62 kg/m² as calculated from the following:    Height as of this encounter: 165.1 cm (65\").    Weight as of this encounter: 75.3 kg (166 lb).    Lungs: non labored breathing, no wheezing or tachpnea  Extremities: extremities normal, atraumatic, no cyanosis or edema  Skin: Skin color, texture, turgor normal. No rashes or lesions  Neurologic: Grossly normal  General:   alert, appears stated age, and cooperative   Abdomen: soft, non-tender, without masses or organomegaly and incision is clean dry and intact                             Assessment:      Diagnoses and all orders for this visit:    1. Postpartum care following  delivery (Primary)      Follow-up 3 to 4 weeks for postpartum visit  Patient is doing very well  Baby is doing well        "

## 2024-10-10 ENCOUNTER — POSTPARTUM VISIT (OUTPATIENT)
Dept: OBSTETRICS AND GYNECOLOGY | Age: 34
End: 2024-10-10
Payer: COMMERCIAL

## 2024-10-10 VITALS
BODY MASS INDEX: 27.99 KG/M2 | SYSTOLIC BLOOD PRESSURE: 108 MMHG | WEIGHT: 168 LBS | DIASTOLIC BLOOD PRESSURE: 68 MMHG | HEIGHT: 65 IN

## 2024-10-10 NOTE — PROGRESS NOTES
GYN Visit    10/10/2024    Patient: Daisy Tate          MR#:[id number]      Chief Complaint   Patient presents with    Postpartum Care     CC:  7 wk's 2 days postpartum visit today ,  24 , baby boy Ramírez Palafox , pt is bottle feeding, pt has no complaints today , last pap 22 neg        History of Present Illness    34 y.o. female  who presents for postpartum check    Patient is doing well no baby blues  She is not due for her Pap until next year  Baby is doing well with his pyelectasis he is growing out of it  She is bottlefeeding  She would like to do birth control pills for birth control method  I will send a OCP to her pharmacy  No other complaints  Follow-up 1 year        No LMP recorded (lmp unknown).    ________________________________________  Patient Active Problem List   Diagnosis    History of ectopic pregnancy    Anxiety    Gastroesophageal reflux disease without esophagitis    AMA (advanced maternal age) primigravida 35+, third trimester    Pyelectasis of fetus on prenatal ultrasound    Abnormal fetal ultrasound    Prolonged spontaneous rupture of membranes     delivery delivered       Past Medical History:   Diagnosis Date    Encounter for insertion of Mirena IUD     History of fainting spells of unknown cause     Migraine     Pregnancy, ectopic, tubal 2016       Past Surgical History:   Procedure Laterality Date     SECTION N/A 2024    Procedure:  SECTION PRIMARY;  Surgeon: Azul Carr MD;  Location: [facility] LABOR DELIVERY;  Service: Obstetrics/Gynecology;  Laterality: N/A;    DIAGNOSTIC LAPAROSCOPY Left 11/10/2016    Procedure:  LAPAROSCOPIC LEFT SALPINGECTOMY ;  Surgeon: Marycruz Edwards MD;  Location: [facility] OR;  Service:     MOUTH SURGERY      WISDOM TOOTH EXTRACTION         Social History     Tobacco Use   Smoking Status Never    Passive exposure: Never   Smokeless Tobacco Never       has a current medication list  "which includes the following prescription(s): prenatal vitamin 27-0.8 and norethindrone-ethinyl estradiol-ferrous fumarate.  ________________________________________    Current contraception: none      The following portions of the patient's history were reviewed and updated as appropriate: allergies, current medications, past family history, past medical history, past social history, past surgical history, and problem list.    Review of Systems    Pertinent items are noted in HPI.     Objective   Physical Exam    /   Ht 165.1 cm (65\")   Wt 76.2 kg (168 lb)   LMP  (LMP Unknown)   Breastfeeding No   BMI 27.96 kg/m²    BP Readings from Last 3 Encounters:   10/10/24 108/68   24 108/64   24 109/56      Wt Readings from Last 3 Encounters:   10/10/24 76.2 kg (168 lb)   24 75.3 kg (166 lb)   24 83 kg (183 lb)      BMI: Estimated body mass index is 27.96 kg/m² as calculated from the following:    Height as of this encounter: 165.1 cm (65\").    Weight as of this encounter: 76.2 kg (168 lb).    Lungs: non labored breathing, no wheezing or tachpnea  Extremities: extremities normal, atraumatic, no cyanosis or edema  Skin: Skin color, texture, turgor normal. No rashes or lesions  Neurologic: Grossly normal  General:   alert, appears stated age, and cooperative   Abdomen: soft, non-tender, without masses or organomegaly    Incision CDI                         Assessment:      Diagnoses and all orders for this visit:    1. Postpartum care following  delivery (Primary)    Other orders  -     norethindrone-ethinyl estradiol-ferrous fumarate (LOESTIN 24 FE) 1-20 MG-MCG(24) per tablet; Take 1 tablet by mouth Daily.  Dispense: 84 tablet; Refill: 3              "

## (undated) DEVICE — GLV SURG BIOGEL LTX PF 6 1/2

## (undated) DEVICE — 3M™ TEGADERM™ TRANSPARENT FILM DRESSING FRAME STYLE, 1627, 4 IN X 10 IN (10 CM X 25 CM), 20/CT 4CT/CASE: Brand: 3M™ TEGADERM™

## (undated) DEVICE — SUT MONOCRYL PLS 3/0 CT1 UD/MF 90CM MCP944H

## (undated) DEVICE — ANTIBACTERIAL UNDYED BRAIDED (POLYGLACTIN 910), SYNTHETIC ABSORBABLE SUTURE: Brand: COATED VICRYL

## (undated) DEVICE — SUT MNCRYL 0/0 CTX 36IN Y398H

## (undated) DEVICE — SUT MNCRYL 0 CT1 36IN UD MCP946H

## (undated) DEVICE — SOL IRR NACL 0.9PCT BT 1000ML

## (undated) DEVICE — CUFF SCD HEMOFORCE SEQ CALF STD MD

## (undated) DEVICE — SUT MNCRYL 3/0 KS 27IN UD MCP523H